# Patient Record
Sex: FEMALE | Race: WHITE
[De-identification: names, ages, dates, MRNs, and addresses within clinical notes are randomized per-mention and may not be internally consistent; named-entity substitution may affect disease eponyms.]

---

## 2017-06-23 ENCOUNTER — HOSPITAL ENCOUNTER (OUTPATIENT)
Dept: HOSPITAL 61 - SURG | Age: 82
Discharge: HOME | End: 2017-06-23
Attending: INTERNAL MEDICINE
Payer: MEDICARE

## 2017-06-23 VITALS
SYSTOLIC BLOOD PRESSURE: 114 MMHG | SYSTOLIC BLOOD PRESSURE: 114 MMHG | DIASTOLIC BLOOD PRESSURE: 47 MMHG | DIASTOLIC BLOOD PRESSURE: 47 MMHG

## 2017-06-23 DIAGNOSIS — E78.00: ICD-10-CM

## 2017-06-23 DIAGNOSIS — I10: ICD-10-CM

## 2017-06-23 DIAGNOSIS — E66.9: ICD-10-CM

## 2017-06-23 DIAGNOSIS — J44.9: ICD-10-CM

## 2017-06-23 DIAGNOSIS — Z98.41: ICD-10-CM

## 2017-06-23 DIAGNOSIS — M19.90: ICD-10-CM

## 2017-06-23 DIAGNOSIS — I48.91: Primary | ICD-10-CM

## 2017-06-23 DIAGNOSIS — Z87.39: ICD-10-CM

## 2017-06-23 DIAGNOSIS — I25.10: ICD-10-CM

## 2017-06-23 DIAGNOSIS — Z98.42: ICD-10-CM

## 2017-06-23 DIAGNOSIS — I73.9: ICD-10-CM

## 2017-06-23 DIAGNOSIS — Z88.2: ICD-10-CM

## 2017-06-23 DIAGNOSIS — Z90.710: ICD-10-CM

## 2017-06-23 LAB
ANION GAP SERPL CALC-SCNC: 11 MMOL/L (ref 6–14)
BUN SERPL-MCNC: 19 MG/DL (ref 7–20)
CALCIUM SERPL-MCNC: 8.7 MG/DL (ref 8.5–10.1)
CHLORIDE SERPL-SCNC: 104 MMOL/L (ref 98–107)
CO2 SERPL-SCNC: 27 MMOL/L (ref 21–32)
CREAT SERPL-MCNC: 1.1 MG/DL (ref 0.6–1)
GFR SERPLBLD BASED ON 1.73 SQ M-ARVRAT: 47.4 ML/MIN
GLUCOSE SERPL-MCNC: 97 MG/DL (ref 70–99)
MAGNESIUM SERPL-MCNC: 2 MG/DL (ref 1.8–2.4)
POTASSIUM SERPL-SCNC: 3.4 MMOL/L (ref 3.5–5.1)
SODIUM SERPL-SCNC: 142 MMOL/L (ref 136–145)

## 2017-06-23 PROCEDURE — 83735 ASSAY OF MAGNESIUM: CPT

## 2017-06-23 PROCEDURE — 36415 COLL VENOUS BLD VENIPUNCTURE: CPT

## 2017-06-23 PROCEDURE — 92960 CARDIOVERSION ELECTRIC EXT: CPT

## 2017-06-23 PROCEDURE — 80048 BASIC METABOLIC PNL TOTAL CA: CPT

## 2017-06-23 PROCEDURE — 93005 ELECTROCARDIOGRAM TRACING: CPT

## 2017-06-23 NOTE — EKG
[facility]

               [address] [place] [address]

Test Date:    2017               Test Time:    13:42:48

Pat Name:     BECKI ROJAS                Department:   

Patient ID:   [id number]           Room:          

Gender:       F                        Technician:   ALECIA

:          1934               Requested By: CURLY GAUTAM

Order Number: [id number]            Reading MD:     

                                 Measurements

Intervals                              Axis          

Rate:         92                       P:            

MN:                                    QRS:          51

QRSD:         106                      T:            112

QT:           410                                    

QTc:          513                                    

                           Interpretive Statements

IRREGULAR RHYTHM, NO P-WAVE FOUND

VENTRICULAR PREMATURE COMPLEX(ES)

LOW LIMB LEAD VOLTAGE

ST & T ABNORMALITY, CONSIDER

INFERIOR ISCHEMIA OR LEFT VENTRICULAR STRAIN

ABNORMAL ECG

RI6.01

Compared to ECG 2015 12:08:20

Atrial fibrillation no longer present

T-wave abnormality still present

Possible ischemia still present

## 2017-06-23 NOTE — CCR
DATE OF SERVICE:  06/23/2017



PROCEDURE DONE:  Cardioversion.



PERFORMING PHYSICIAN:  Curly Luna M.D.



INDICATIONS:  Atrial fibrillation.



COMPLICATIONS:  None.



DESCRIPTION OF PROCEDURE:  An informed consent was obtained from the patient. 

Anesthesiology team administered intravenous propofol for general anesthesia. 

The patient was then administered 200 joules of synchronized biphasic DC current

with successful conversion of the patient's rhythm to sinus rhythm.  The patient

was hemodynamically stable without any neurological deficits at the end of

procedure.  The patient tolerated the procedure well.



CONCLUSIONS:  Successful cardioversion of atrial fibrillation to sinus rhythm.

 



______________________________

CURLY LUNA MD



DR:  ANTWAN/selvin  JOB#:  [id number] / [id number]

DD:  06/23/2017 16:37  DT:  06/23/2017 21:12

## 2017-06-23 NOTE — EKG
[facility]

              [address] [place] [address]

Test Date:    2017               Test Time:    14:55:29

Pat Name:     BECKI ROJAS                Department:   

Patient ID:   [id number]           Room:          

Gender:       F                        Technician:   

:          1934               Requested By: CURLY GAUTAM

Order Number: [id number]            Reading MD:     

                                 Measurements

Intervals                              Axis          

Rate:         61                       P:            59

MD:           196                      QRS:          0

QRSD:         100                      T:            -29

QT:           546                                    

QTc:          552                                    

                           Interpretive Statements

SINUS RHYTHM

VENTRICULAR PREMATURE COMPLEX(ES)

ATRIAL PREMATURE COMPLEX(ES), BIGEMINY

LEFTWARD AXIS

LOW LIMB LEAD VOLTAGE

T ABNORMALITY IN INFERIOR LEADS

PROLONGED QT

ABNORMAL ECG

RI6.01

No previous ECG available for comparison

## 2017-07-31 ENCOUNTER — HOSPITAL ENCOUNTER (OUTPATIENT)
Dept: HOSPITAL 61 - US | Age: 82
Discharge: HOME | End: 2017-07-31
Attending: INTERNAL MEDICINE
Payer: COMMERCIAL

## 2017-07-31 DIAGNOSIS — R60.0: Primary | ICD-10-CM

## 2017-07-31 PROCEDURE — 93970 EXTREMITY STUDY: CPT

## 2017-07-31 NOTE — RAD
--------------- APPROVED REPORT --------------





Patient Location : OUT-PATIENT



Indications

Lower Extremity Edema :     Bilateral



Findings

The right great saphenous vein measures 7.6 mm. The right lesser saphenous vein measures 3.8 mm. Ther
e is no evidence of thrombus on limited grayscale images with normal color Doppler and spectral wavef
orms demonstrating no evidence of reflux.



The left great saphenous vein measures 7.1 mm.  The left lesser saphenous vein was not well visualize
d. There is no evidence of thrombus on limited grayscale images with normal color Doppler and spectra
l waveforms demonstrating no evidence of reflux.



Critical Notification

Critical Value: No



<Conclusion>

1. Negative for reflux in the bilateral greater and right lesser saphenous vein.

## 2017-08-25 ENCOUNTER — HOSPITAL ENCOUNTER (INPATIENT)
Dept: HOSPITAL 63 - ER | Age: 82
LOS: 4 days | Discharge: HOME | DRG: 314 | End: 2017-08-29
Attending: FAMILY MEDICINE | Admitting: FAMILY MEDICINE
Payer: MEDICARE

## 2017-08-25 VITALS — SYSTOLIC BLOOD PRESSURE: 116 MMHG | DIASTOLIC BLOOD PRESSURE: 78 MMHG

## 2017-08-25 VITALS — DIASTOLIC BLOOD PRESSURE: 88 MMHG | SYSTOLIC BLOOD PRESSURE: 125 MMHG

## 2017-08-25 VITALS — DIASTOLIC BLOOD PRESSURE: 77 MMHG | SYSTOLIC BLOOD PRESSURE: 118 MMHG

## 2017-08-25 VITALS — HEIGHT: 68 IN | WEIGHT: 193.31 LBS | BODY MASS INDEX: 29.3 KG/M2

## 2017-08-25 VITALS — DIASTOLIC BLOOD PRESSURE: 77 MMHG | SYSTOLIC BLOOD PRESSURE: 119 MMHG

## 2017-08-25 VITALS — SYSTOLIC BLOOD PRESSURE: 111 MMHG | DIASTOLIC BLOOD PRESSURE: 71 MMHG

## 2017-08-25 VITALS — DIASTOLIC BLOOD PRESSURE: 69 MMHG | SYSTOLIC BLOOD PRESSURE: 110 MMHG

## 2017-08-25 VITALS — DIASTOLIC BLOOD PRESSURE: 73 MMHG | SYSTOLIC BLOOD PRESSURE: 122 MMHG

## 2017-08-25 VITALS — DIASTOLIC BLOOD PRESSURE: 79 MMHG | SYSTOLIC BLOOD PRESSURE: 124 MMHG

## 2017-08-25 VITALS — DIASTOLIC BLOOD PRESSURE: 72 MMHG | SYSTOLIC BLOOD PRESSURE: 120 MMHG

## 2017-08-25 DIAGNOSIS — N17.0: ICD-10-CM

## 2017-08-25 DIAGNOSIS — K21.9: ICD-10-CM

## 2017-08-25 DIAGNOSIS — I95.9: Primary | ICD-10-CM

## 2017-08-25 DIAGNOSIS — I11.0: ICD-10-CM

## 2017-08-25 DIAGNOSIS — Z95.820: ICD-10-CM

## 2017-08-25 DIAGNOSIS — N28.9: ICD-10-CM

## 2017-08-25 DIAGNOSIS — E78.5: ICD-10-CM

## 2017-08-25 DIAGNOSIS — I27.2: ICD-10-CM

## 2017-08-25 DIAGNOSIS — R06.89: ICD-10-CM

## 2017-08-25 DIAGNOSIS — I50.43: ICD-10-CM

## 2017-08-25 DIAGNOSIS — Z87.891: ICD-10-CM

## 2017-08-25 DIAGNOSIS — I07.1: ICD-10-CM

## 2017-08-25 DIAGNOSIS — Z90.710: ICD-10-CM

## 2017-08-25 DIAGNOSIS — I89.0: ICD-10-CM

## 2017-08-25 DIAGNOSIS — I48.91: ICD-10-CM

## 2017-08-25 DIAGNOSIS — I73.9: ICD-10-CM

## 2017-08-25 DIAGNOSIS — Z88.2: ICD-10-CM

## 2017-08-25 DIAGNOSIS — J44.1: ICD-10-CM

## 2017-08-25 DIAGNOSIS — I34.0: ICD-10-CM

## 2017-08-25 LAB
ALBUMIN SERPL-MCNC: 3.4 G/DL (ref 3.4–5)
ALBUMIN/GLOB SERPL: 1 {RATIO} (ref 1–1.7)
ALP SERPL-CCNC: 121 U/L (ref 46–116)
ALT SERPL-CCNC: 17 U/L (ref 14–59)
ANION GAP SERPL CALC-SCNC: 8 MMOL/L (ref 6–14)
AST SERPL-CCNC: 22 U/L (ref 15–37)
BASOPHILS # BLD AUTO: 0.1 X10^3/UL (ref 0–0.2)
BASOPHILS NFR BLD: 1 % (ref 0–3)
BILIRUB SERPL-MCNC: 1.2 MG/DL (ref 0.2–1)
BUN/CREAT SERPL: 18 (ref 6–20)
CA-I SERPL ISE-MCNC: 22 MG/DL (ref 7–20)
CALCIUM SERPL-MCNC: 9 MG/DL (ref 8.5–10.1)
CHLORIDE SERPL-SCNC: 105 MMOL/L (ref 98–107)
CO2 SERPL-SCNC: 25 MMOL/L (ref 21–32)
CREAT SERPL-MCNC: 1.2 MG/DL (ref 0.6–1)
EOSINOPHIL NFR BLD: 0 % (ref 0–3)
EOSINOPHIL NFR BLD: 0 X10^3/UL (ref 0–0.7)
ERYTHROCYTE [DISTWIDTH] IN BLOOD BY AUTOMATED COUNT: 14.3 % (ref 11.5–14.5)
GFR SERPLBLD BASED ON 1.73 SQ M-ARVRAT: 42.9 ML/MIN
GLOBULIN SER-MCNC: 3.3 G/DL (ref 2.2–3.8)
GLUCOSE SERPL-MCNC: 114 MG/DL (ref 70–99)
HCT VFR BLD CALC: 34.1 % (ref 36–47)
HGB BLD-MCNC: 11 G/DL (ref 12–15.5)
LYMPHOCYTES # BLD: 1.1 X10^3/UL (ref 1–4.8)
LYMPHOCYTES NFR BLD AUTO: 17 % (ref 24–48)
MCH RBC QN AUTO: 30 PG (ref 25–35)
MCHC RBC AUTO-ENTMCNC: 32 G/DL (ref 31–37)
MCV RBC AUTO: 92 FL (ref 79–100)
MONO #: 0.9 X10^3/UL (ref 0–1.1)
MONOCYTES NFR BLD: 13 % (ref 0–9)
NEUT #: 4.6 X10^3UL (ref 1.8–7.7)
NEUTROPHILS NFR BLD AUTO: 69 % (ref 31–73)
PLATELET # BLD AUTO: 223 X10^3/UL (ref 140–400)
POTASSIUM SERPL-SCNC: 4.1 MMOL/L (ref 3.5–5.1)
PROT SERPL-MCNC: 6.7 G/DL (ref 6.4–8.2)
RBC # BLD AUTO: 3.69 X10^6/UL (ref 3.5–5.4)
SODIUM SERPL-SCNC: 138 MMOL/L (ref 136–145)
WBC # BLD AUTO: 6.6 X10^3/UL (ref 4–11)

## 2017-08-25 PROCEDURE — 83735 ASSAY OF MAGNESIUM: CPT

## 2017-08-25 PROCEDURE — 85007 BL SMEAR W/DIFF WBC COUNT: CPT

## 2017-08-25 PROCEDURE — 96374 THER/PROPH/DIAG INJ IV PUSH: CPT

## 2017-08-25 PROCEDURE — 83880 ASSAY OF NATRIURETIC PEPTIDE: CPT

## 2017-08-25 PROCEDURE — 93005 ELECTROCARDIOGRAM TRACING: CPT

## 2017-08-25 PROCEDURE — 87641 MR-STAPH DNA AMP PROBE: CPT

## 2017-08-25 PROCEDURE — 71010: CPT

## 2017-08-25 PROCEDURE — 36415 COLL VENOUS BLD VENIPUNCTURE: CPT

## 2017-08-25 PROCEDURE — 85027 COMPLETE CBC AUTOMATED: CPT

## 2017-08-25 PROCEDURE — 85025 COMPLETE CBC W/AUTO DIFF WBC: CPT

## 2017-08-25 PROCEDURE — 84484 ASSAY OF TROPONIN QUANT: CPT

## 2017-08-25 PROCEDURE — 94640 AIRWAY INHALATION TREATMENT: CPT

## 2017-08-25 PROCEDURE — 80053 COMPREHEN METABOLIC PANEL: CPT

## 2017-08-25 RX ADMIN — METHYLPREDNISOLONE SODIUM SUCCINATE SCH MG: 40 INJECTION, POWDER, FOR SOLUTION INTRAMUSCULAR; INTRAVENOUS at 16:54

## 2017-08-25 RX ADMIN — IPRATROPIUM BROMIDE AND ALBUTEROL SULFATE SCH ML: .5; 3 SOLUTION RESPIRATORY (INHALATION) at 16:05

## 2017-08-25 RX ADMIN — METHYLPREDNISOLONE SODIUM SUCCINATE SCH MG: 40 INJECTION, POWDER, FOR SOLUTION INTRAMUSCULAR; INTRAVENOUS at 20:59

## 2017-08-25 RX ADMIN — GUAIFENESIN AND DEXTROMETHORPHAN HYDROBROMIDE SCH TAB: 600; 30 TABLET, EXTENDED RELEASE ORAL at 16:54

## 2017-08-25 RX ADMIN — POTASSIUM CHLORIDE SCH MEQ: 1500 TABLET, EXTENDED RELEASE ORAL at 20:59

## 2017-08-25 RX ADMIN — SIMVASTATIN SCH MG: 5 TABLET, FILM COATED ORAL at 20:59

## 2017-08-25 RX ADMIN — GUAIFENESIN AND DEXTROMETHORPHAN HYDROBROMIDE SCH TAB: 600; 30 TABLET, EXTENDED RELEASE ORAL at 20:59

## 2017-08-25 RX ADMIN — IPRATROPIUM BROMIDE AND ALBUTEROL SULFATE SCH ML: .5; 3 SOLUTION RESPIRATORY (INHALATION) at 21:17

## 2017-08-25 NOTE — PHYS DOC
Past History


Past Medical History:  A-Fib, COPD, GERD, Other


Past Surgical History:  Hysterectomy, Other


Alcohol Use:  None


Drug Use:  None





Adult General


Chief Complaint


Chief Complaint:  CHEST PAIN





HPI


HPI





83-year-old female with a history of atrial fibrillation, COPD, suspected 

evolving congestive heart failure, [facility], now presents the emergency 

department complaining of palpitations and mild shortness of breath today. She'

s had these symptoms over the last several days. She has shortness of breath it'

s worse when lying down as well as with exertion. Denies chest pain at any 

time. No productive cough or fever. Patient went to her primary care doctor 

this morning and was evaluated and referred to the emergency department for 

admission given the finding of atrial fibrillation with rapid ventricular 

response. She states she is comfortable at rest.





Review of Systems


Review of Systems





Constitutional: Denies fever or chills []


Eyes: Denies change in visual acuity, redness, or eye pain []


HENT: Denies nasal congestion or sore throat []


Respiratory: Denies cough or shortness of breath []


Cardiovascular: No additional information not addressed in HPI []


GI: Denies abdominal pain, nausea, vomiting, bloody stools or diarrhea []


: Denies dysuria or hematuria []


Musculoskeletal: Denies back pain or joint pain []


Integument: Denies rash or skin lesions []


Neurologic: Denies headache, focal weakness or sensory changes []


Endocrine: Denies polyuria or polydipsia []





Current Medications


Current Medications





Current Medications








 Medications


  (Trade)  Dose


 Ordered  Sig/Dariel  Start Time


 Stop Time Status Last Admin


Dose Admin


 


 Amiodarone HCl


 900 mg/Dextrose  518 ml @ 0


 mls/hr  1X  ONCE  8/25/17 11:45


 8/25/17 11:45 DC  


 


 


 Dextrose  100 ml @ 


 As Directed  STK-MED ONCE  8/25/17 10:53


 8/25/17 10:54 DC  


 


 


 Diltiazem HCl


  (Cardizem)  125 mg  STK-MED ONCE  8/25/17 10:54


 8/25/17 10:55 DC  


 


 


 Diltiazem HCl 125


 mg/Dextrose  125 ml @ 0


 mls/hr  1X  ONCE  8/25/17 10:45


 8/25/17 10:46 DC 8/25/17 11:03


5 MLS/HR


 


 Sodium Chloride  1,000 ml @ 


 500 mls/hr  1X  ONCE  8/25/17 10:45


 8/25/17 12:44   


 











Allergies


Allergies





Allergies








Coded Allergies Type Severity Reaction Last Updated Verified


 


  Sulfa (Sulfonamide Antibiotics) Allergy Unknown  8/25/17 Yes











Physical Exam


Physical Exam





Constitutional: Well developed, well nourished, no acute distress, non-toxic 

appearance. []


HENT: Normocephalic, atraumatic, bilateral external ears normal, oropharynx 

moist, no oral exudates, nose normal. []


Eyes: PERRLA, EOMI, conjunctiva normal, no discharge. [] 


Neck: Normal range of motion, no tenderness, supple, no stridor. [] 


Cardiovascular: Irregularly irregular rhythm with tachycardia 130s. No murmur []


Lungs & Thorax:  Bilateral breath sounds clear to auscultation [] no wheezes 

rails rubs or rhonchi


Abdomen: Bowel sounds normal, soft, no tenderness, no masses, no pulsatile 

masses. [] 


Skin: Warm, dry, no erythema, no rash. [] 


Back: No tenderness, no CVA tenderness. [] 


Extremities: No tenderness, no cyanosis, no clubbing, ROM intact, 1+ pitting 

edema bilateral lower extremities with mild bilateral induration and no 

asymmetry of findings.


Neurologic: Alert and oriented X 3, normal motor function, normal sensory 

function, no focal deficits noted. []


Psychologic: Affect normal, judgement normal, mood normal. []





Current Patient Data


Vital Signs





 Vital Signs








  Date Time  Temp Pulse Resp B/P (MAP) Pulse Ox O2 Delivery O2 Flow Rate FiO2


 


8/25/17 11:13  102 16 98/53 (68) 94   


 


8/25/17 10:16 97.9     Room Air  








Lab Results





 Laboratory Tests








Test


  8/25/17


10:43 8/25/17


10:53


 


White Blood Count


  6.6 x10^3/uL


(4.0-11.0) 


 


 


Red Blood Count


  3.69 x10^6/uL


(3.50-5.40) 


 


 


Hemoglobin


  11.0 g/dL


(12.0-15.5)  L 


 


 


Hematocrit


  34.1 %


(36.0-47.0)  L 


 


 


Mean Corpuscular Volume


  92 fL ()


  


 


 


Mean Corpuscular Hemoglobin 30 pg (25-35)   


 


Mean Corpuscular Hemoglobin


Concent 32 g/dL


(31-37) 


 


 


Red Cell Distribution Width


  14.3 %


(11.5-14.5) 


 


 


Platelet Count


  223 x10^3/uL


(140-400) 


 


 


Neutrophils (%) (Auto) 69 % (31-73)   


 


Lymphocytes (%) (Auto) 17 % (24-48)  L 


 


Monocytes (%) (Auto) 13 % (0-9)  H 


 


Eosinophils (%) (Auto) 0 % (0-3)   


 


Basophils (%) (Auto) 1 % (0-3)   


 


Neutrophils # (Auto)


  4.6 x10^3uL


(1.8-7.7) 


 


 


Lymphocytes # (Auto)


  1.1 x10^3/uL


(1.0-4.8) 


 


 


Monocytes # (Auto)


  0.9 x10^3/uL


(0.0-1.1) 


 


 


Eosinophils # (Auto)


  0.0 x10^3/uL


(0.0-0.7) 


 


 


Basophils # (Auto)


  0.1 x10^3/uL


(0.0-0.2) 


 


 


Sodium Level


  138 mmol/L


(136-145) 


 


 


Potassium Level


  4.1 mmol/L


(3.5-5.1) 


 


 


Chloride Level


  105 mmol/L


() 


 


 


Carbon Dioxide Level


  25 mmol/L


(21-32) 


 


 


Anion Gap 8 (6-14)   


 


Blood Urea Nitrogen


  22 mg/dL


(7-20)  H 


 


 


Creatinine


  1.2 mg/dL


(0.6-1.0)  H 


 


 


Estimated GFR


(Cockcroft-Gault) 42.9  


  


 


 


BUN/Creatinine Ratio 18 (6-20)   


 


Glucose Level


  114 mg/dL


(70-99)  H 


 


 


Calcium Level


  9.0 mg/dL


(8.5-10.1) 


 


 


Total Bilirubin


  1.2 mg/dL


(0.2-1.0)  H 


 


 


Aspartate Amino Transferase


(AST) 22 U/L (15-37)


  


 


 


Alanine Aminotransferase (ALT)


  17 U/L (14-59)


  


 


 


Alkaline Phosphatase


  121 U/L


()  H 


 


 


Total Protein


  6.7 g/dL


(6.4-8.2) 


 


 


Albumin


  3.4 g/dL


(3.4-5.0) 


 


 


Albumin/Globulin Ratio 1.0 (1.0-1.7)   


 


POC Troponin I


  


  0.01 ng/ml


(<0.08)











EKG


EKG


[]





Radiology/Procedures


Radiology/Procedures


[]





Course & Med Decision Making


Course & Med Decision Making


Pertinent Labs and Imaging studies reviewed. (See chart for details)





[]





Dragon Disclaimer


Dragon Disclaimer


This chart was dictated in whole or in part using Voice Recognition software in 

a busy, high-work load, and often noisy Emergency Department environment.  It 

may contain unintended and wholly unrecognized errors or omissions.





Departure


Departure:


Referrals:  


LUCI PANDYA MD (PCP)











BIN COWAN MD Aug 25, 2017 11:52

## 2017-08-25 NOTE — HP
ADMIT DATE:  08/25/2017



REASON FOR ADMISSION:  Atrial fibrillation with RVR.



HISTORY OF PRESENT ILLNESS:  This is an 83-year-old female who has a history of

recurrent atrial fibrillation status post ECV in 01/2016 and 06/2017 with

successful conversion to sinus rhythm per Cardiology.  She states she has become

progressively short of breath, could not sleep and was up all night last night. 

She has been coughing up sputum and noticed a 14-pound weight gain.



PAST MEDICAL HISTORY:  Chronic diastolic heart failure, recurrent atrial

fibrillation, COPD, hyperlipidemia, chronic lymphedema, peripheral arterial

disease with left subclavian stenosis status post PTA, moderate mitral regurg,

moderate tricuspid regurg and pulmonary hypertension.



PAST SURGICAL HISTORY:  Left subclavian stent, hysterectomy, tonsillectomy.



FAMILY HISTORY:  Noncontributory.



SOCIAL HISTORY:  Smoked years ago, but quit.  No alcohol or drugs.



ALLERGIES:  SULFA.



MEDICATIONS:  Reviewed and the list is accurate on the MAR.



REVIEW OF SYSTEMS:  Positive for weight gain, shortness of breath and wheezing,

and reports ankle swelling.



OBJECTIVE:

VITAL SIGNS:  Blood pressure 122/73, pulse 102, temperature 98.1, respiratory

rate 23, O2 sat is 94-96% on room air.  Height 68 inches, weight 197.6 pounds.

GENERAL:  The patient's color is greyish.

HEENT:  Hearing is normal.  Eyes are clear.  Nose is patent.  Throat was clear. 

Tongue was moist.

NECK:  Supple.

LUNGS:  With diffuse inspiratory and expiratory wheezes.

CARDIOVASCULAR:  Irregular rhythm and rate consistent with AFib, difficult to

hear heart sounds due to wheezing.

ABDOMEN:  Soft, nontender.

EXTREMITIES:  With 1-2+ edema.  Pulses are weak in her feet.



LABORATORY DATA:  Hemoglobin 11.0, hematocrit 34.1.  Chemistry:  BUN 22,

creatinine 1.2.  BNP 2569, troponin 0.01.  EKG is not available to review on the

computer.



ASSESSMENT:

1.  Atrial fibrillation with rapid ventricular response.

2.  Acute exacerbation of chronic obstructive pulmonary disease.

3.  Acute on chronic diastolic heart failure.

4.  Chronic lymphedema.

5.  Peripheral arterial disease.

6.  Moderate mitral regurge.

7.  Moderate tricuspid regurge.

8.  Moderate pulmonary hypertension.

9.  Hypertension.



PLAN:  She is on amiodarone drip, being monitored by Cardiology, will receive

some IV Lasix.  I have added in small dose of Solu-Medrol with breathing

treatments and Mucinex and will continue to monitor.





______________________________

ELIANE WOLF DO



DR:  NONI/selvin  JOB#:  [id number] / [id number]

DD:  08/25/2017 16:48  DT:  08/25/2017 18:19

## 2017-08-25 NOTE — RAD
Portable chest, 8/25/2017:



History: Chest pain



Comparison is made to a study from 10/30/2010. The heart is at the upper

limits of normal in size. There is tortuosity of the thoracic aorta. The

pulmonary vascularity is normal. There is minimal atelectasis or scarring

partially obscuring the left hemidiaphragm. The lungs are otherwise clear. No

definite pleural fluid is appreciated.



IMPRESSION:

1. Borderline cardiomegaly.

2. Minimal left basilar atelectasis or scarring..

## 2017-08-25 NOTE — PDOC2
ODETTE HANKS 8/25/17 1111:


CONSULT


Date of Admission


DATE: 8/25/17 


TIME: 11:10


Reason for Consult:


atrial fibrillation with RVR


History of Present Illness


Ms Johnson is an 83 year old female with history of recurrent atrial fibrillation s/

p ECV in Jan 2015 and June 2017 with successful conversion to sinus rhythm,  

chronic diastolic heart failure, hypertension, PAD and hyperlipidemia.  She 

presents today after being seen in her PCP office with complaints of dyspnea 

and weight gain.  She was found to be back in atrial fibrillation with RVR.  

She reports increased use of her inhalers and wheezing lately.  Her daughter 

apparently felt she was more short of breath is not bad.  She is visibly short 

of breath and reports a gain of 10 lbs recently.  She denies chest discomfort, 

orthopnea or PND.


Past Medical History


diastolic heart failure


recurrent atrial fibrillation


COPD


Hyperlipidemia


chronic lymphedema, 


PAD - left subclavian stenosis s/p PTA





Echo 11/2016


EF 45-50%


diastolic dysfunction


moderate MR


Moderate TR


PAS 40-50 mmHg





MPI 11/16


Normal Perfusion


Past Surgical History


left subclavian stent


hysterectomy


Family History


non contributory due to age


Social History


prior smoker, no significant ETOH, no illicit drugs


Current Medications





Current Medications


Sodium Chloride 1,000 ml @  500 mls/hr 1X  ONCE IV ;  Start 8/25/17 at 10:45;  

Stop 8/25/17 at 12:44


Diltiazem HCl (Cardizem) 10 mg 1X  ONCE IVP  Last administered on 8/25/17at 11:

01;  Start 8/25/17 at 10:40;  Stop 8/25/17 at 10:41;  Status DC


Diltiazem HCl 125 mg/Dextrose 125 ml @ 0 mls/hr 1X  ONCE IV  Last administered 

on 8/25/17at 11:03;  Start 8/25/17 at 10:45;  Stop 8/25/17 at 10:46;  Status DC


Dextrose 100 ml @  As Directed STK-MED ONCE IV ;  Start 8/25/17 at 10:53;  Stop 

8/25/17 at 10:54;  Status DC


Diltiazem HCl (Cardizem) 125 mg STK-MED ONCE IV ;  Start 8/25/17 at 10:54;  

Stop 8/25/17 at 10:55;  Status DC





Active Scripts


Active


Reported


Simvastatin 5 Mg Tablet 5 Mg PO  


Multi-Vitamin Daily (Multivitamin) 1 Each Tablet 1 Each PO


Allergies:  


Coded Allergies:  


     Sulfa (Sulfonamide Antibiotics) (Verified  Allergy, Unknown, 8/25/17)


Review of System


as per HPI


General:  Alert, Oriented X3, Cooperative, mild distress


HEENT:  Atraumatic, EOMI, Mucous membr. moist/pink


Lungs:  Other (decreased with scattered expiratory wheezing)


Heart:  Other


Abdomen:  Normal bowel sounds, Soft, No tenderness


Extremities:  No cyanosis, Normal pulses, Other (+chronic edema)


Neuro:  Normal speech


Psych/Mental Status:  Mental status NL, Mood NL


VITALS





Vital Signs








  Date Time  Temp Pulse Resp B/P (MAP) Pulse Ox O2 Delivery O2 Flow Rate FiO2


 


8/25/17 11:01  133  99/62    








Labs





Laboratory Tests








Test


  8/25/17


10:43


 


White Blood Count


  6.6 x10^3/uL


(4.0-11.0)


 


Red Blood Count


  3.69 x10^6/uL


(3.50-5.40)


 


Hemoglobin


  11.0 g/dL


(12.0-15.5)


 


Hematocrit


  34.1 %


(36.0-47.0)


 


Mean Corpuscular Volume 92 fL () 


 


Mean Corpuscular Hemoglobin 30 pg (25-35) 


 


Mean Corpuscular Hemoglobin


Concent 32 g/dL


(31-37)


 


Red Cell Distribution Width


  14.3 %


(11.5-14.5)


 


Platelet Count


  223 x10^3/uL


(140-400)


 


Neutrophils (%) (Auto) 69 % (31-73) 


 


Lymphocytes (%) (Auto) 17 % (24-48) 


 


Monocytes (%) (Auto) 13 % (0-9) 


 


Eosinophils (%) (Auto) 0 % (0-3) 


 


Basophils (%) (Auto) 1 % (0-3) 


 


Neutrophils # (Auto)


  4.6 x10^3uL


(1.8-7.7)


 


Lymphocytes # (Auto)


  1.1 x10^3/uL


(1.0-4.8)


 


Monocytes # (Auto)


  0.9 x10^3/uL


(0.0-1.1)


 


Eosinophils # (Auto)


  0.0 x10^3/uL


(0.0-0.7)


 


Basophils # (Auto)


  0.1 x10^3/uL


(0.0-0.2)








Images


EKG - atrial fibrillation with RVR, LBBB pattern


Assessment/Plan


1.  recurrent atrial fibrillation with RVR - amiodarone drip and discontinue 

fleccanide.  Continue Xarelto for stroke prophylaxis.  


2.  respiratory insufficiency acute on chronic - likely multifactorial due to 

COPD and acute on chronic combined systolic and diastolic heart failure - 

gentle diuresis for chf.  COPD per PCP.


3.  hypotension - mild - change Cardizem to amiodarone


4.  hyperlipidemia - continue statin.


Problems:  





CURLY GAUTAM MD 8/25/17 1639:


CONSULT


Allergies:  


Coded Allergies:  


     Sulfa (Sulfonamide Antibiotics) (Verified  Allergy, Unknown, 8/25/17)


Assessment/Plan


Patient seen and examined. Agree with NP's assessment and plan


Recurrent atrial fibrillation rate better controlled since admission.


Continue Cardizem and start amiodarone infusion per protocol since she failed 

flecainide for antiarrhythmic therapy.


Continue gentle diuresis for mild acute on chronic diastolic heart failure. 

Recent 2-D echo showed LVEF 45-50%.


Continue xarelto for stroke prophylaxis.


Thank you for your consultation.


Problems:  











ODETTE HANKS Aug 25, 2017 11:11


CURLY GAUTAM MD Aug 25, 2017 16:39

## 2017-08-25 NOTE — EKG
[facility] [address], [place] [address]

Test Date:    2017               Test Time:    10:40:56

Pat Name:     BECKI ROJAS                Department:   

Patient ID:   [id number]           Room:         [place] [address]

Gender:       F                        Technician:   

:          1934               Requested By: BIN COWAN

Order Number: [id number]            Reading MD:   Catrachito Omalley

                                 Measurements

Intervals                              Axis          

Rate:         130                      P:            

FL:                                    QRS:          -36

QRSD:         128                      T:            99

QT:           342                                    

QTc:          503                                    

                           Interpretive Statements

ATRIAL FIB./FLUTTER WITH RAPID VENTRICULAR RESPONSE AND ABERRANT CONDUCTION

Electronically Signed On 2017 7:21:22 CDT by Catrachito Omalley

## 2017-08-26 VITALS — DIASTOLIC BLOOD PRESSURE: 62 MMHG | SYSTOLIC BLOOD PRESSURE: 108 MMHG

## 2017-08-26 VITALS — SYSTOLIC BLOOD PRESSURE: 125 MMHG | DIASTOLIC BLOOD PRESSURE: 68 MMHG

## 2017-08-26 VITALS — SYSTOLIC BLOOD PRESSURE: 109 MMHG | DIASTOLIC BLOOD PRESSURE: 79 MMHG

## 2017-08-26 VITALS — SYSTOLIC BLOOD PRESSURE: 146 MMHG | DIASTOLIC BLOOD PRESSURE: 66 MMHG

## 2017-08-26 VITALS — DIASTOLIC BLOOD PRESSURE: 60 MMHG | SYSTOLIC BLOOD PRESSURE: 101 MMHG

## 2017-08-26 VITALS — SYSTOLIC BLOOD PRESSURE: 119 MMHG | DIASTOLIC BLOOD PRESSURE: 75 MMHG

## 2017-08-26 VITALS — SYSTOLIC BLOOD PRESSURE: 86 MMHG | DIASTOLIC BLOOD PRESSURE: 55 MMHG

## 2017-08-26 VITALS — DIASTOLIC BLOOD PRESSURE: 60 MMHG | SYSTOLIC BLOOD PRESSURE: 126 MMHG

## 2017-08-26 VITALS — DIASTOLIC BLOOD PRESSURE: 62 MMHG | SYSTOLIC BLOOD PRESSURE: 112 MMHG

## 2017-08-26 VITALS — DIASTOLIC BLOOD PRESSURE: 64 MMHG | SYSTOLIC BLOOD PRESSURE: 97 MMHG

## 2017-08-26 VITALS — SYSTOLIC BLOOD PRESSURE: 98 MMHG | DIASTOLIC BLOOD PRESSURE: 62 MMHG

## 2017-08-26 VITALS — SYSTOLIC BLOOD PRESSURE: 99 MMHG | DIASTOLIC BLOOD PRESSURE: 62 MMHG

## 2017-08-26 VITALS — DIASTOLIC BLOOD PRESSURE: 66 MMHG | SYSTOLIC BLOOD PRESSURE: 146 MMHG

## 2017-08-26 VITALS — DIASTOLIC BLOOD PRESSURE: 53 MMHG | SYSTOLIC BLOOD PRESSURE: 86 MMHG

## 2017-08-26 VITALS — DIASTOLIC BLOOD PRESSURE: 79 MMHG | SYSTOLIC BLOOD PRESSURE: 119 MMHG

## 2017-08-26 VITALS — SYSTOLIC BLOOD PRESSURE: 119 MMHG | DIASTOLIC BLOOD PRESSURE: 78 MMHG

## 2017-08-26 VITALS — DIASTOLIC BLOOD PRESSURE: 50 MMHG | SYSTOLIC BLOOD PRESSURE: 84 MMHG

## 2017-08-26 VITALS — SYSTOLIC BLOOD PRESSURE: 89 MMHG | DIASTOLIC BLOOD PRESSURE: 57 MMHG

## 2017-08-26 VITALS — SYSTOLIC BLOOD PRESSURE: 115 MMHG | DIASTOLIC BLOOD PRESSURE: 72 MMHG

## 2017-08-26 VITALS — DIASTOLIC BLOOD PRESSURE: 53 MMHG | SYSTOLIC BLOOD PRESSURE: 103 MMHG

## 2017-08-26 VITALS — DIASTOLIC BLOOD PRESSURE: 81 MMHG | SYSTOLIC BLOOD PRESSURE: 117 MMHG

## 2017-08-26 VITALS — SYSTOLIC BLOOD PRESSURE: 95 MMHG | DIASTOLIC BLOOD PRESSURE: 55 MMHG

## 2017-08-26 VITALS — DIASTOLIC BLOOD PRESSURE: 72 MMHG | SYSTOLIC BLOOD PRESSURE: 110 MMHG

## 2017-08-26 VITALS — SYSTOLIC BLOOD PRESSURE: 147 MMHG | DIASTOLIC BLOOD PRESSURE: 66 MMHG

## 2017-08-26 VITALS — SYSTOLIC BLOOD PRESSURE: 85 MMHG | DIASTOLIC BLOOD PRESSURE: 46 MMHG

## 2017-08-26 VITALS — DIASTOLIC BLOOD PRESSURE: 83 MMHG | SYSTOLIC BLOOD PRESSURE: 112 MMHG

## 2017-08-26 VITALS — DIASTOLIC BLOOD PRESSURE: 80 MMHG | SYSTOLIC BLOOD PRESSURE: 113 MMHG

## 2017-08-26 VITALS — SYSTOLIC BLOOD PRESSURE: 108 MMHG | DIASTOLIC BLOOD PRESSURE: 57 MMHG

## 2017-08-26 VITALS — SYSTOLIC BLOOD PRESSURE: 102 MMHG | DIASTOLIC BLOOD PRESSURE: 57 MMHG

## 2017-08-26 LAB
ALBUMIN SERPL-MCNC: 3.4 G/DL (ref 3.4–5)
ALBUMIN/GLOB SERPL: 0.9 {RATIO} (ref 1–1.7)
ALP SERPL-CCNC: 124 U/L (ref 46–116)
ALT SERPL-CCNC: 16 U/L (ref 14–59)
ANION GAP SERPL CALC-SCNC: 8 MMOL/L (ref 6–14)
AST SERPL-CCNC: 18 U/L (ref 15–37)
BASOPHILS # BLD AUTO: 0 X10^3/UL (ref 0–0.2)
BASOPHILS NFR BLD: 0 % (ref 0–3)
BILIRUB SERPL-MCNC: 1.1 MG/DL (ref 0.2–1)
BUN/CREAT SERPL: 16 (ref 6–20)
CA-I SERPL ISE-MCNC: 23 MG/DL (ref 7–20)
CALCIUM SERPL-MCNC: 9 MG/DL (ref 8.5–10.1)
CHLORIDE SERPL-SCNC: 104 MMOL/L (ref 98–107)
CO2 SERPL-SCNC: 26 MMOL/L (ref 21–32)
CREAT SERPL-MCNC: 1.4 MG/DL (ref 0.6–1)
EOSINOPHIL NFR BLD: 0 % (ref 0–3)
EOSINOPHIL NFR BLD: 0 X10^3/UL (ref 0–0.7)
ERYTHROCYTE [DISTWIDTH] IN BLOOD BY AUTOMATED COUNT: 15 % (ref 11.5–14.5)
GFR SERPLBLD BASED ON 1.73 SQ M-ARVRAT: 35.9 ML/MIN
GLOBULIN SER-MCNC: 3.6 G/DL (ref 2.2–3.8)
GLUCOSE SERPL-MCNC: 187 MG/DL (ref 70–99)
HCT VFR BLD CALC: 35.9 % (ref 36–47)
HGB BLD-MCNC: 11.6 G/DL (ref 12–15.5)
LYMPHOCYTES # BLD: 0.5 X10^3/UL (ref 1–4.8)
LYMPHOCYTES NFR BLD AUTO: 13 % (ref 24–48)
MAGNESIUM SERPL-MCNC: 1.9 MG/DL (ref 1.8–2.4)
MCH RBC QN AUTO: 30 PG (ref 25–35)
MCHC RBC AUTO-ENTMCNC: 32 G/DL (ref 31–37)
MCV RBC AUTO: 93 FL (ref 79–100)
MONO #: 0.1 X10^3/UL (ref 0–1.1)
MONOCYTES NFR BLD: 3 % (ref 0–9)
NEUT #: 3.4 X10^3UL (ref 1.8–7.7)
NEUTROPHILS NFR BLD AUTO: 84 % (ref 31–73)
PLATELET # BLD AUTO: 234 X10^3/UL (ref 140–400)
POTASSIUM SERPL-SCNC: 4 MMOL/L (ref 3.5–5.1)
PROT SERPL-MCNC: 7 G/DL (ref 6.4–8.2)
RBC # BLD AUTO: 3.88 X10^6/UL (ref 3.5–5.4)
SODIUM SERPL-SCNC: 138 MMOL/L (ref 136–145)
WBC # BLD AUTO: 4 X10^3/UL (ref 4–11)

## 2017-08-26 RX ADMIN — METHYLPREDNISOLONE SODIUM SUCCINATE SCH MG: 40 INJECTION, POWDER, FOR SOLUTION INTRAMUSCULAR; INTRAVENOUS at 09:14

## 2017-08-26 RX ADMIN — IPRATROPIUM BROMIDE AND ALBUTEROL SULFATE SCH ML: .5; 3 SOLUTION RESPIRATORY (INHALATION) at 15:47

## 2017-08-26 RX ADMIN — IPRATROPIUM BROMIDE AND ALBUTEROL SULFATE SCH ML: .5; 3 SOLUTION RESPIRATORY (INHALATION) at 10:10

## 2017-08-26 RX ADMIN — IPRATROPIUM BROMIDE AND ALBUTEROL SULFATE SCH ML: .5; 3 SOLUTION RESPIRATORY (INHALATION) at 05:19

## 2017-08-26 RX ADMIN — GUAIFENESIN AND DEXTROMETHORPHAN HYDROBROMIDE SCH TAB: 600; 30 TABLET, EXTENDED RELEASE ORAL at 20:45

## 2017-08-26 RX ADMIN — SIMVASTATIN SCH MG: 5 TABLET, FILM COATED ORAL at 20:45

## 2017-08-26 RX ADMIN — GUAIFENESIN AND DEXTROMETHORPHAN HYDROBROMIDE SCH TAB: 600; 30 TABLET, EXTENDED RELEASE ORAL at 09:15

## 2017-08-26 RX ADMIN — RIVAROXABAN SCH MG: 10 TABLET, FILM COATED ORAL at 09:16

## 2017-08-26 RX ADMIN — IPRATROPIUM BROMIDE AND ALBUTEROL SULFATE SCH ML: .5; 3 SOLUTION RESPIRATORY (INHALATION) at 21:01

## 2017-08-26 RX ADMIN — ASPIRIN 81 MG SCH MG: 81 TABLET ORAL at 09:15

## 2017-08-26 RX ADMIN — POTASSIUM CHLORIDE SCH MEQ: 1500 TABLET, EXTENDED RELEASE ORAL at 20:45

## 2017-08-26 RX ADMIN — CLOPIDOGREL BISULFATE SCH MG: 75 TABLET ORAL at 09:16

## 2017-08-26 RX ADMIN — METHYLPREDNISOLONE SODIUM SUCCINATE SCH MG: 40 INJECTION, POWDER, FOR SOLUTION INTRAMUSCULAR; INTRAVENOUS at 20:44

## 2017-08-26 RX ADMIN — Medication SCH MG: at 09:15

## 2017-08-26 RX ADMIN — POTASSIUM CHLORIDE SCH MEQ: 1500 TABLET, EXTENDED RELEASE ORAL at 09:15

## 2017-08-26 NOTE — ACF
Admission Criteria Forms


                                         ATRIAL FIBRILLATION





Clinical Indications for Admission to Inpatient Care


                                                                ([place]/check 

or initial the applicable condition/criteria) 





Admission is indicated for ANY ONE of the following(1)(2)(3)(4)(5)(6)(7)(8):





[ ]I.     Myocardial ischemia that persists despite outpatient and observation 

care treatment (13)


[ ]II.    Myocardial infarction 


[ ]III.   Hemodynamic instability


[ ]IV.   Heart failure (e.g., pulmonary edema) (14)


[ ]V.    Altered mental status that is severe or persistent


          complications secondary to comorbidities (eg, symptomatic heart 

failure)


[ ]VI.   Syncope


[ ]VII.  Patient has implantable cardioverter defibrillator that has fired more 

than once within past 24hror needs immediate adjustment of settings that cannot 

be done other than in inpatient                      setting. (15)


[ ]VIII. Suspected accessory pathway (e.g., Matthew-Parkinson-White syndrome) on 

ECG


[ ]IX.   Medication toxicity (e.g., digitalis) causing arrhythmia(16)


[ ]X.    Underlying medical condition that necessitates inpatient care(e.g., 

thyrotoxicosis, pneumonia)(17)


[ ]XI.   Continuous ECG monitoring is required for condition causing arrhythmia 

(e.g., severe hyperkalemia, hypokalemia, acid-base disturbance)(18)(19)(20)


[ ]XII.  Initiation of antiarrhythmic drug therapy is needed in patient at high 

risk of adverse effects as indicated by ANY ONE of the following:


           [ ]a)    Significant structural heart disease (e.g., reduced 

ejection fraction, congenital heart disease, valvular heart disease)        


           [ ]b)    Prolonged QT interval


           [ ]c)    Underlying sinus node or atrioventricular conduction 

disturbances


           [ ]d)    Need for treatment with antiarrhythmic drugs that have 

significant proarrhythmic potential (e.g., dofetilide, sotalol, procainamide)


           [ ]e)    Patient whose sinus rhythm has never been observed on ECG


[X]XIII.   Persistent symptomatic tachycardia (rate > 100 bpm) despite 

outpatient and observation level of care (eg, rate cannot be sufficiently 

controlled


[ ]XIV.  Elective or urgent cardioversion that cannot be performed on 

outpatient basis or during observation care. [A] (Use also A Fib: Observation 

Care ) as appropriate.(21)(22)(23)











Extended stay beyond goal length of stay may be needed for (1)(43)(44):





[ ]a)   Unstable comorbidities (eg, heart failure, COPD, renal insuffciency)


[ ]b)   Persistently uncontrolled atrial fibrillation or other arrhythmias (45)


[ ]c)   Acute thromboembolic event (e.g., stroke, limb ischemia)


[ ]d)   Need for inpatient attainment of full anticoagulation(28)(46)(47)











The original [facility] content created by [facility] 

[employer] has been revised. 


The portions of the content which have been revised are identified through the 

use of italic text, and [facility] 


has neither reviewed nor approved the modified material. All other unmodified 

content is copyright  [facility].





Please see references footnoted in the original [facility] [employer] edition 

2015


Admission Criteria Met?:  Yes











NOEMI FAIR Aug 26, 2017 06:53

## 2017-08-26 NOTE — PDOC
PROGRESS NOTES


Assessment


1.  recurrent atrial fibrillation with RVR - on amiodarone drip.  rate remains 

uncontrolled.  blood pressure low, add digoxin.  Continue Xarelto for stroke 

prophylaxis.  


2.  respiratory insufficiency acute on chronic - likely multifactorial due to 

COPD and acute on chronic combined systolic and diastolic heart failure - good 

output with IV lasix.  change to PO.  COPD per PCP.


3.  renal insufficiency - change lasix to PO.  monitor labs.


4.  hypotension - mild.  monitor


4.  hyperlipidemia - continue statin.


Problems:  


Subjective


She reports improvement in breathing.  No chest pain, no lightheadedness, no 

palpitations.


Objective


tele -  atrial fibrillation with RVR, 110-120s. 





Vital Signs








  Date Time  Temp Pulse Resp B/P (MAP) Pulse Ox O2 Delivery O2 Flow Rate FiO2


 


8/26/17 08:00      Nasal Cannula 2.0 


 


8/26/17 07:11  115 17 99/62 (74) 99   


 


8/26/17 05:19 97.6       














Intake and Output 


 


 8/26/17





 07:00


 


Intake Total 840 ml


 


Output Total 1900 ml


 


Balance -1060 ml


 


 


 


Intake Oral 840 ml


 


Output Urine Total 1900 ml








Abdomen:  Normal bowel sounds, Soft, No tenderness


Heart:  Other (irregular rate and rhythm, no gallops)


Extremities:  No cyanosis, Normal pulses, Other (trace edema)


General:  Alert, Oriented X3, Cooperative, No acute distress


HEENT:  Atraumatic, EOMI, Mucous membr. moist/pink


Lungs:  Other (decreased with few expiratory wheezes)


Neuro:  Normal speech


Psych/Mental Status:  Mental status NL, Mood NL


Review of Relevant


I have reviewed the following items kandice (where applicable) has been applied.


Labs





Laboratory Tests








Test


  8/25/17


10:43 8/25/17


10:53 8/25/17


16:52 8/25/17


22:45


 


White Blood Count


  6.6 x10^3/uL


(4.0-11.0) 


  


  


 


 


Red Blood Count


  3.69 x10^6/uL


(3.50-5.40) 


  


  


 


 


Hemoglobin


  11.0 g/dL


(12.0-15.5) 


  


  


 


 


Hematocrit


  34.1 %


(36.0-47.0) 


  


  


 


 


Mean Corpuscular Volume 92 fL ()    


 


Mean Corpuscular Hemoglobin 30 pg (25-35)    


 


Mean Corpuscular Hemoglobin


Concent 32 g/dL


(31-37) 


  


  


 


 


Red Cell Distribution Width


  14.3 %


(11.5-14.5) 


  


  


 


 


Platelet Count


  223 x10^3/uL


(140-400) 


  


  


 


 


Neutrophils (%) (Auto) 69 % (31-73)    


 


Lymphocytes (%) (Auto) 17 % (24-48)    


 


Monocytes (%) (Auto) 13 % (0-9)    


 


Eosinophils (%) (Auto) 0 % (0-3)    


 


Basophils (%) (Auto) 1 % (0-3)    


 


Neutrophils # (Auto)


  4.6 x10^3uL


(1.8-7.7) 


  


  


 


 


Lymphocytes # (Auto)


  1.1 x10^3/uL


(1.0-4.8) 


  


  


 


 


Monocytes # (Auto)


  0.9 x10^3/uL


(0.0-1.1) 


  


  


 


 


Eosinophils # (Auto)


  0.0 x10^3/uL


(0.0-0.7) 


  


  


 


 


Basophils # (Auto)


  0.1 x10^3/uL


(0.0-0.2) 


  


  


 


 


Sodium Level


  138 mmol/L


(136-145) 


  


  


 


 


Potassium Level


  4.1 mmol/L


(3.5-5.1) 


  


  


 


 


Chloride Level


  105 mmol/L


() 


  


  


 


 


Carbon Dioxide Level


  25 mmol/L


(21-32) 


  


  


 


 


Anion Gap 8 (6-14)    


 


Blood Urea Nitrogen


  22 mg/dL


(7-20) 


  


  


 


 


Creatinine


  1.2 mg/dL


(0.6-1.0) 


  


  


 


 


Estimated GFR


(Cockcroft-Gault) 42.9 


  


  


  


 


 


BUN/Creatinine Ratio 18 (6-20)    


 


Glucose Level


  114 mg/dL


(70-99) 


  


  


 


 


Calcium Level


  9.0 mg/dL


(8.5-10.1) 


  


  


 


 


Total Bilirubin


  1.2 mg/dL


(0.2-1.0) 


  


  


 


 


Aspartate Amino Transf


(AST/SGOT) 22 U/L (15-37) 


  


  


  


 


 


Alanine Aminotransferase


(ALT/SGPT) 17 U/L (14-59) 


  


  


  


 


 


Alkaline Phosphatase


  121 U/L


() 


  


  


 


 


NT-Pro-B-Type Natriuretic


Peptide 2569 pg/mL


(0-449) 


  


  


 


 


Total Protein


  6.7 g/dL


(6.4-8.2) 


  


  


 


 


Albumin


  3.4 g/dL


(3.4-5.0) 


  


  


 


 


Albumin/Globulin Ratio 1.0 (1.0-1.7)    


 


Bedside Troponin I


  


  0.01 ng/ml


(<0.08) 


  


 


 


Troponin I Quantitative


  


  


  < 0.017 ng/mL


(0-0.055) 0.019 ng/mL


(0-0.055)


 


Test


  8/26/17


05:44 


  


  


 


 


White Blood Count


  4.0 x10^3/uL


(4.0-11.0) 


  


  


 


 


Red Blood Count


  3.88 x10^6/uL


(3.50-5.40) 


  


  


 


 


Hemoglobin


  11.6 g/dL


(12.0-15.5) 


  


  


 


 


Hematocrit


  35.9 %


(36.0-47.0) 


  


  


 


 


Mean Corpuscular Volume 93 fL ()    


 


Mean Corpuscular Hemoglobin 30 pg (25-35)    


 


Mean Corpuscular Hemoglobin


Concent 32 g/dL


(31-37) 


  


  


 


 


Red Cell Distribution Width


  15.0 %


(11.5-14.5) 


  


  


 


 


Platelet Count


  234 x10^3/uL


(140-400) 


  


  


 


 


Neutrophils (%) (Auto) 84 % (31-73)    


 


Lymphocytes (%) (Auto) 13 % (24-48)    


 


Monocytes (%) (Auto) 3 % (0-9)    


 


Eosinophils (%) (Auto) 0 % (0-3)    


 


Basophils (%) (Auto) 0 % (0-3)    


 


Neutrophils # (Auto)


  3.4 x10^3uL


(1.8-7.7) 


  


  


 


 


Lymphocytes # (Auto)


  0.5 x10^3/uL


(1.0-4.8) 


  


  


 


 


Monocytes # (Auto)


  0.1 x10^3/uL


(0.0-1.1) 


  


  


 


 


Eosinophils # (Auto)


  0.0 x10^3/uL


(0.0-0.7) 


  


  


 


 


Basophils # (Auto)


  0.0 x10^3/uL


(0.0-0.2) 


  


  


 


 


Sodium Level


  138 mmol/L


(136-145) 


  


  


 


 


Potassium Level


  4.0 mmol/L


(3.5-5.1) 


  


  


 


 


Chloride Level


  104 mmol/L


() 


  


  


 


 


Carbon Dioxide Level


  26 mmol/L


(21-32) 


  


  


 


 


Anion Gap 8 (6-14)    


 


Blood Urea Nitrogen


  23 mg/dL


(7-20) 


  


  


 


 


Creatinine


  1.4 mg/dL


(0.6-1.0) 


  


  


 


 


Estimated GFR


(Cockcroft-Gault) 35.9 


  


  


  


 


 


BUN/Creatinine Ratio 16 (6-20)    


 


Glucose Level


  187 mg/dL


(70-99) 


  


  


 


 


Calcium Level


  9.0 mg/dL


(8.5-10.1) 


  


  


 


 


Magnesium Level


  1.9 mg/dL


(1.8-2.4) 


  


  


 


 


Total Bilirubin


  1.1 mg/dL


(0.2-1.0) 


  


  


 


 


Aspartate Amino Transf


(AST/SGOT) 18 U/L (15-37) 


  


  


  


 


 


Alanine Aminotransferase


(ALT/SGPT) 16 U/L (14-59) 


  


  


  


 


 


Alkaline Phosphatase


  124 U/L


() 


  


  


 


 


Total Protein


  7.0 g/dL


(6.4-8.2) 


  


  


 


 


Albumin


  3.4 g/dL


(3.4-5.0) 


  


  


 


 


Albumin/Globulin Ratio 0.9 (1.0-1.7)    








Medications





Current Medications


Sodium Chloride 1,000 ml @  500 mls/hr 1X  ONCE IV ;  Start 8/25/17 at 10:45;  

Stop 8/25/17 at 12:44;  Status DC


Diltiazem HCl (Cardizem) 10 mg 1X  ONCE IVP  Last administered on 8/25/17at 11:

01;  Start 8/25/17 at 10:40;  Stop 8/25/17 at 10:41;  Status DC


Diltiazem HCl 125 mg/Dextrose 125 ml @ 0 mls/hr 1X  ONCE IV  Last administered 

on 8/25/17at 11:03;  Start 8/25/17 at 10:45;  Stop 8/25/17 at 10:46;  Status DC


Dextrose 100 ml @  As Directed STK-MED ONCE IV ;  Start 8/25/17 at 10:53;  Stop 

8/25/17 at 10:54;  Status DC


Diltiazem HCl (Cardizem) 125 mg STK-MED ONCE IV ;  Start 8/25/17 at 10:54;  

Stop 8/25/17 at 10:55;  Status DC


Amiodarone HCl 900 mg/Dextrose 518 ml @ 0 mls/hr 1X  ONCE IV  Last administered 

on 8/25/17at 12:05;  Start 8/25/17 at 11:45;  Stop 8/25/17 at 11:46;  Status DC


Amiodarone HCl 900 mg/Dextrose 518 ml @ 0 mls/hr 1X  ONCE IV ;  Start 8/25/17 

at 11:45;  Stop 8/25/17 at 11:45;  Status DC


Ondansetron HCl (Zofran) 4 mg PRN Q8HRS  PRN IV NAUSEA/VOMITING;  Start 8/25/17 

at 13:45


Furosemide (Lasix) 40 mg DAILY IVP  Last administered on 8/25/17at 15:56;  

Start 8/25/17 at 15:00;  Stop 8/26/17 at 08:45;  Status DC


Amiodarone HCl 900 mg/Dextrose 518 ml @ 0 mls/hr CONT  PRN IV SEE I/O RECORD 

Last administered on 8/25/17at 15:27;  Start 8/25/17 at 15:00;  Stop 8/25/17 at 

15:27;  Status DC


Albuterol/ Ipratropium (Duoneb) 3 ml RTQID NEB  Last administered on 8/26/17at 

05:19;  Start 8/25/17 at 16:00


Guaifenesin (MUCINEX ER with DM) 1 tab BID PO  Last administered on 8/25/17at 20

:59;  Start 8/25/17 at 16:20


Methylprednisolone Sodium Succinate (SOLU-Medrol 40MG VIAL) 40 mg Q12HR IV  

Last administered on 8/25/17at 20:59;  Start 8/25/17 at 16:20


Aspirin (Children'S Aspirin) 81 mg DAILY PO ;  Start 8/26/17 at 09:00


Clopidogrel Bisulfate (Plavix) 75 mg DAILY PO ;  Start 8/26/17 at 09:00


Simvastatin (Zocor) 5 mg HS PO  Last administered on 8/25/17at 20:59;  Start 8/ 25/17 at 21:00


Potassium Chloride (Klor-Con) 20 meq BID PO  Last administered on 8/25/17at 20:

59;  Start 8/25/17 at 21:00


Rivaroxaban (Xarelto) 20 mg DAILY PO ;  Start 8/26/17 at 09:00


Levothyroxine Sodium (Synthroid) 25 mcg DAILY07 PO ;  Start 8/26/17 at 08:45


Furosemide (Lasix) 40 mg DAILY PO ;  Start 8/26/17 at 09:00;  Status UNV


Digoxin (Lanoxin) 500 mcg 1X  ONCE IV ;  Start 8/26/17 at 08:45;  Stop 8/26/17 

at 08:46;  Status UNV





Active Scripts


Active


Reported


Plavix (Clopidogrel Bisulfate) 75 Mg Tablet 75 Mg PO DAILY


Xarelto (Rivaroxaban) 20 Mg Tablet 20 Mg PO DAILY


Aspirin 81 Mg Tab.chew 81 Mg PO DAILY


Lasix (Furosemide) 40 Mg Tablet 1 Tab PO DAILY


K-Tab ER (Potassium Chloride) 20 Meq Tablet.er 20 Meq PO BID


Simvastatin 5 Mg Tablet 5 Mg PO HS


Multi-Vitamin Daily (Multivitamin) 1 Each Tablet 1 Each PO


Vitals/I & O





Vital Sign - Last 24 Hours








 8/25/17 8/25/17 8/25/17 8/25/17





 10:16 11:01 11:13 11:19


 


Temp 97.9   


 


Pulse 136 133 102 96


 


Resp 16  16 16


 


B/P (MAP)  99/62 98/53 (68) 107/59 (75)


 


Pulse Ox 96  94 95


 


O2 Delivery Room Air   


 


    





 8/25/17 8/25/17 8/25/17 8/25/17





 11:49 12:19 12:49 14:37


 


Temp    98.1


 


Pulse 114 96 97 102


 


Resp 16 16 16 23


 


B/P (MAP) 96/73 (81) 105/62 (76) 112/74 (87) 122/73 (89)


 


Pulse Ox 94 96 97 96


 


O2 Delivery    Room Air


 


    





 8/25/17 8/25/17 8/25/17 8/25/17





 16:07 16:22 17:22 18:19


 


Temp    97.7


 


Pulse  110 99 


 


Resp  23 23 


 


B/P (MAP)  119/77 (91) 116/78 (91) 


 


Pulse Ox 95 96 96 


 


O2 Delivery Room Air Room Air Room Air 


 


    





 8/25/17 8/25/17 8/25/17 8/25/17





 18:22 19:22 20:22 21:20


 


Pulse 113 107 101 


 


Resp 18 22 22 


 


B/P (MAP) 111/71 (84) 124/79 (94) 125/88 (100) 


 


Pulse Ox 96 97 98 95


 


O2 Delivery Nasal Cannula Nasal Cannula Nasal Cannula Room Air


 


O2 Flow Rate 2.0 2.0 2.0 





 8/25/17 8/25/17 8/25/17 8/26/17





 21:22 22:22 23:22 00:22


 


Pulse 119 116 117 120


 


Resp 22 22 22 22


 


B/P (MAP) 118/77 (91) 120/72 (88) 110/69 (83) 119/75 (90)


 


Pulse Ox 97 98 95 95


 


O2 Delivery Nasal Cannula Nasal Cannula Nasal Cannula Nasal Cannula


 


O2 Flow Rate 2.0 2.0 2.0 2.0





 8/26/17 8/26/17 8/26/17 8/26/17





 01:22 02:22 03:22 04:22


 


Pulse 104 111 112 114


 


Resp 22 20 20 22


 


B/P (MAP) 112/83 (93) 117/81 (93) 109/79 (89) 113/80 (91)


 


Pulse Ox 95 96 95 97


 


O2 Delivery Nasal Cannula Nasal Cannula Nasal Cannula Nasal Cannula


 


O2 Flow Rate 2.0 2.0 2.0 2.0





 8/26/17 8/26/17 8/26/17 8/26/17





 05:19 05:20 05:22 06:22


 


Temp 97.6   


 


Pulse   113 107


 


Resp   20 16


 


B/P (MAP)   119/79 (92) 119/79 (92)


 


Pulse Ox  96 98 98


 


O2 Delivery  Room Air Nasal Cannula Nasal Cannula


 


O2 Flow Rate   2.0 2.0


 


    





 8/26/17 8/26/17  





 07:11 08:00  


 


Pulse 115   


 


Resp 17   


 


B/P (MAP) 99/62 (74)   


 


Pulse Ox 99   


 


O2 Delivery Nasal Cannula Nasal Cannula  


 


O2 Flow Rate 2.0 2.0  














Intake and Output   


 


 8/25/17 8/25/17 8/26/17





 15:00 23:00 07:00


 


Intake Total  440 ml 400 ml


 


Output Total  1550 ml 350 ml


 


Balance  -1110 ml 50 ml

















ODETTE HANKS APRN Aug 26, 2017 08:55

## 2017-08-26 NOTE — EKG
[facility] [address], [place] [address]

Test Date:    2017               Test Time:    10:39:28

Pat Name:     BECKI ROJAS                Department:   

Patient ID:   [id number]           Room:         [place] [address]

Gender:       F                        Technician:   

:          [id number]               Requested By: ELIANE WOLF

Order Number: [id number]            Reading MD:   Catrachito Omalley

                                 Measurements

Intervals                              Axis          

Rate:         149                      P:            

LA:                                    QRS:          -34

QRSD:         130                      T:            83

QT:           320                                    

QTc:          508                                    

                           Interpretive Statements

WIDE COMPLEX TACHYCARDIA 

-MOST LIKELY ATRIAL FLUTTER WITH ABERRANT CONDUCTION



Electronically Signed On 2017 7:21:04 CDT by Catrachito Omalley

## 2017-08-27 VITALS — DIASTOLIC BLOOD PRESSURE: 58 MMHG | SYSTOLIC BLOOD PRESSURE: 105 MMHG

## 2017-08-27 VITALS — DIASTOLIC BLOOD PRESSURE: 57 MMHG | SYSTOLIC BLOOD PRESSURE: 94 MMHG

## 2017-08-27 VITALS — SYSTOLIC BLOOD PRESSURE: 110 MMHG | DIASTOLIC BLOOD PRESSURE: 56 MMHG

## 2017-08-27 VITALS — DIASTOLIC BLOOD PRESSURE: 50 MMHG | SYSTOLIC BLOOD PRESSURE: 100 MMHG

## 2017-08-27 VITALS — SYSTOLIC BLOOD PRESSURE: 114 MMHG | DIASTOLIC BLOOD PRESSURE: 60 MMHG

## 2017-08-27 VITALS — SYSTOLIC BLOOD PRESSURE: 105 MMHG | DIASTOLIC BLOOD PRESSURE: 51 MMHG

## 2017-08-27 VITALS — SYSTOLIC BLOOD PRESSURE: 118 MMHG | DIASTOLIC BLOOD PRESSURE: 59 MMHG

## 2017-08-27 VITALS — DIASTOLIC BLOOD PRESSURE: 63 MMHG | SYSTOLIC BLOOD PRESSURE: 106 MMHG

## 2017-08-27 VITALS — SYSTOLIC BLOOD PRESSURE: 104 MMHG | DIASTOLIC BLOOD PRESSURE: 52 MMHG

## 2017-08-27 VITALS — SYSTOLIC BLOOD PRESSURE: 109 MMHG | DIASTOLIC BLOOD PRESSURE: 65 MMHG

## 2017-08-27 VITALS — DIASTOLIC BLOOD PRESSURE: 64 MMHG | SYSTOLIC BLOOD PRESSURE: 115 MMHG

## 2017-08-27 VITALS — SYSTOLIC BLOOD PRESSURE: 104 MMHG | DIASTOLIC BLOOD PRESSURE: 44 MMHG

## 2017-08-27 VITALS — SYSTOLIC BLOOD PRESSURE: 90 MMHG | DIASTOLIC BLOOD PRESSURE: 49 MMHG

## 2017-08-27 VITALS — SYSTOLIC BLOOD PRESSURE: 96 MMHG | DIASTOLIC BLOOD PRESSURE: 45 MMHG

## 2017-08-27 VITALS — DIASTOLIC BLOOD PRESSURE: 61 MMHG | SYSTOLIC BLOOD PRESSURE: 114 MMHG

## 2017-08-27 VITALS — SYSTOLIC BLOOD PRESSURE: 107 MMHG | DIASTOLIC BLOOD PRESSURE: 45 MMHG

## 2017-08-27 VITALS — SYSTOLIC BLOOD PRESSURE: 120 MMHG | DIASTOLIC BLOOD PRESSURE: 63 MMHG

## 2017-08-27 VITALS — DIASTOLIC BLOOD PRESSURE: 58 MMHG | SYSTOLIC BLOOD PRESSURE: 114 MMHG

## 2017-08-27 VITALS — SYSTOLIC BLOOD PRESSURE: 87 MMHG | DIASTOLIC BLOOD PRESSURE: 43 MMHG

## 2017-08-27 VITALS — DIASTOLIC BLOOD PRESSURE: 63 MMHG | SYSTOLIC BLOOD PRESSURE: 101 MMHG

## 2017-08-27 VITALS — SYSTOLIC BLOOD PRESSURE: 101 MMHG | DIASTOLIC BLOOD PRESSURE: 59 MMHG

## 2017-08-27 VITALS — SYSTOLIC BLOOD PRESSURE: 120 MMHG | DIASTOLIC BLOOD PRESSURE: 65 MMHG

## 2017-08-27 VITALS — DIASTOLIC BLOOD PRESSURE: 42 MMHG | SYSTOLIC BLOOD PRESSURE: 82 MMHG

## 2017-08-27 VITALS — SYSTOLIC BLOOD PRESSURE: 109 MMHG | DIASTOLIC BLOOD PRESSURE: 54 MMHG

## 2017-08-27 VITALS — SYSTOLIC BLOOD PRESSURE: 113 MMHG | DIASTOLIC BLOOD PRESSURE: 61 MMHG

## 2017-08-27 VITALS — DIASTOLIC BLOOD PRESSURE: 63 MMHG | SYSTOLIC BLOOD PRESSURE: 95 MMHG

## 2017-08-27 VITALS — DIASTOLIC BLOOD PRESSURE: 63 MMHG | SYSTOLIC BLOOD PRESSURE: 102 MMHG

## 2017-08-27 VITALS — DIASTOLIC BLOOD PRESSURE: 56 MMHG | SYSTOLIC BLOOD PRESSURE: 99 MMHG

## 2017-08-27 VITALS — SYSTOLIC BLOOD PRESSURE: 102 MMHG | DIASTOLIC BLOOD PRESSURE: 57 MMHG

## 2017-08-27 LAB
% BANDS: 1 % (ref 0–9)
% LYMPHS: 3 % (ref 24–48)
% MONOS: 2 % (ref 0–10)
% SEGS: 94 % (ref 35–66)
ALBUMIN SERPL-MCNC: 3.2 G/DL (ref 3.4–5)
ALBUMIN/GLOB SERPL: 1 {RATIO} (ref 1–1.7)
ALP SERPL-CCNC: 107 U/L (ref 46–116)
ALT SERPL-CCNC: 14 U/L (ref 14–59)
ANION GAP SERPL CALC-SCNC: 8 MMOL/L (ref 6–14)
AST SERPL-CCNC: 18 U/L (ref 15–37)
BASOPHILS # BLD AUTO: 0 X10^3/UL (ref 0–0.2)
BASOPHILS NFR BLD: 0 % (ref 0–3)
BILIRUB SERPL-MCNC: 0.7 MG/DL (ref 0.2–1)
BUN/CREAT SERPL: 25 (ref 6–20)
CA-I SERPL ISE-MCNC: 27 MG/DL (ref 7–20)
CALCIUM SERPL-MCNC: 8.6 MG/DL (ref 8.5–10.1)
CHLORIDE SERPL-SCNC: 106 MMOL/L (ref 98–107)
CO2 SERPL-SCNC: 26 MMOL/L (ref 21–32)
CREAT SERPL-MCNC: 1.1 MG/DL (ref 0.6–1)
DACRYOCYTES BLD QL SMEAR: (no result)
EOSINOPHIL NFR BLD: 0 % (ref 0–3)
EOSINOPHIL NFR BLD: 0 X10^3/UL (ref 0–0.7)
ERYTHROCYTE [DISTWIDTH] IN BLOOD BY AUTOMATED COUNT: 14.7 % (ref 11.5–14.5)
GFR SERPLBLD BASED ON 1.73 SQ M-ARVRAT: 47.4 ML/MIN
GLOBULIN SER-MCNC: 3.1 G/DL (ref 2.2–3.8)
GLUCOSE SERPL-MCNC: 127 MG/DL (ref 70–99)
HCT VFR BLD CALC: 33 % (ref 36–47)
HGB BLD-MCNC: 10.6 G/DL (ref 12–15.5)
LYMPHOCYTES # BLD: 0.6 X10^3/UL (ref 1–4.8)
LYMPHOCYTES NFR BLD AUTO: 4 % (ref 24–48)
MAGNESIUM SERPL-MCNC: 2.1 MG/DL (ref 1.8–2.4)
MCH RBC QN AUTO: 29 PG (ref 25–35)
MCHC RBC AUTO-ENTMCNC: 32 G/DL (ref 31–37)
MCV RBC AUTO: 92 FL (ref 79–100)
MONO #: 0.6 X10^3/UL (ref 0–1.1)
MONOCYTES NFR BLD: 4 % (ref 0–9)
NEUT #: 13.4 X10^3UL (ref 1.8–7.7)
NEUTROPHILS NFR BLD AUTO: 91 % (ref 31–73)
OVALOCYTES BLD QL SMEAR: (no result)
PLATELET # BLD AUTO: 220 X10^3/UL (ref 140–400)
PLATELET # BLD EST: ADEQUATE 10*3/UL
POLYCHROMASIA BLD QL SMEAR: SLIGHT
POTASSIUM SERPL-SCNC: 4.4 MMOL/L (ref 3.5–5.1)
PROT SERPL-MCNC: 6.3 G/DL (ref 6.4–8.2)
RBC # BLD AUTO: 3.6 X10^6/UL (ref 3.5–5.4)
SCHISTOCYTES BLD QL SMEAR: (no result)
SODIUM SERPL-SCNC: 140 MMOL/L (ref 136–145)
TARGETS BLD QL SMEAR: (no result)
WBC # BLD AUTO: 14.7 X10^3/UL (ref 4–11)

## 2017-08-27 RX ADMIN — IPRATROPIUM BROMIDE AND ALBUTEROL SULFATE SCH ML: .5; 3 SOLUTION RESPIRATORY (INHALATION) at 05:30

## 2017-08-27 RX ADMIN — CLOPIDOGREL BISULFATE SCH MG: 75 TABLET ORAL at 09:16

## 2017-08-27 RX ADMIN — SIMVASTATIN SCH MG: 5 TABLET, FILM COATED ORAL at 21:18

## 2017-08-27 RX ADMIN — GUAIFENESIN AND DEXTROMETHORPHAN HYDROBROMIDE SCH TAB: 600; 30 TABLET, EXTENDED RELEASE ORAL at 21:17

## 2017-08-27 RX ADMIN — GUAIFENESIN AND DEXTROMETHORPHAN HYDROBROMIDE SCH TAB: 600; 30 TABLET, EXTENDED RELEASE ORAL at 09:16

## 2017-08-27 RX ADMIN — METHYLPREDNISOLONE SODIUM SUCCINATE SCH MG: 40 INJECTION, POWDER, FOR SOLUTION INTRAMUSCULAR; INTRAVENOUS at 09:15

## 2017-08-27 RX ADMIN — IPRATROPIUM BROMIDE AND ALBUTEROL SULFATE SCH ML: .5; 3 SOLUTION RESPIRATORY (INHALATION) at 15:43

## 2017-08-27 RX ADMIN — ASPIRIN 81 MG SCH MG: 81 TABLET ORAL at 09:15

## 2017-08-27 RX ADMIN — RIVAROXABAN SCH MG: 10 TABLET, FILM COATED ORAL at 09:16

## 2017-08-27 RX ADMIN — IPRATROPIUM BROMIDE AND ALBUTEROL SULFATE SCH ML: .5; 3 SOLUTION RESPIRATORY (INHALATION) at 20:37

## 2017-08-27 RX ADMIN — POTASSIUM CHLORIDE SCH MEQ: 1500 TABLET, EXTENDED RELEASE ORAL at 21:17

## 2017-08-27 RX ADMIN — IPRATROPIUM BROMIDE AND ALBUTEROL SULFATE SCH ML: .5; 3 SOLUTION RESPIRATORY (INHALATION) at 09:36

## 2017-08-27 RX ADMIN — POTASSIUM CHLORIDE SCH MEQ: 1500 TABLET, EXTENDED RELEASE ORAL at 09:16

## 2017-08-27 RX ADMIN — Medication SCH MG: at 09:16

## 2017-08-27 NOTE — PN
DATE:  08/27/2017



PROBLEMS:

1.  Atrial fibrillation with rapid ventricular response - continues to become

hypotensive and continues to have wide fluctuations in her pulse.  Amiodarone

has been stopped.

2.  Acute on chronic systolic and diastolic heart failure.

3.  Acute exacerbation of chronic obstructive pulmonary disease.

4.  Renal insufficiency.

5.  Hypotension

6.  Hyperlipidemia.



SUBJECTIVE:  The patient is still doing well.  She is less short of breath and

really is anxious to go home except that we still have not resolved her atrial

fibrillation problem at the present time.



OBJECTIVE:

VITAL SIGNS:  Blood pressure 114/58, pulse 101, respirations 19, pulse ox 96% on

room air.

HEENT:  Face is slightly flushed.

HEENT:  Tongue was moist.

NECK:  Supple.

LUNGS:  With a few scattered wheezes.

CARDIOVASCULAR:  Irregular rhythm and rate.

ABDOMEN:  Soft, nontender.

EXTREMITIES:  With trace to 1+ edema.



PLAN:  Dr. Castillo is seeing the patient and will make some adjustments.  I will

decrease her steroids and hold for discharge tomorrow.





______________________________

ELIANE WOLF DO



DR:  NONI/selvin  JOB#:  [id number] / [id number]

DD:  08/27/2017 12:50  DT:  08/27/2017 19:59

## 2017-08-27 NOTE — PN
DATE:  



PROBLEMS:

1.  Atrial fibrillation with rapid ventricular response.

2.  Acute on chronic systolic and diastolic heart failure.

3.  Acute exacerbation of chronic obstructive pulmonary disease.

4.  Renal insufficiency.

5.  Hypotension.

6.  Hyperlipidemia.



SUBJECTIVE:  Doing well, doing better today, less short of breath, less

wheezing.  She remains on the ____ drip, received some digoxin per Cardiology

and her rate is still uncontrolled.



OBJECTIVE:

VITAL SIGNS:  Intake was 840 and output 1900.  Blood pressure 86/53 with a MAP

of 64, pulse 109, respirations 20, pulse ox 98% on room air.

GENERAL:  She is comfortable at rest.

NECK:  Supple.

LUNGS:  With a few scattered wheezes.

CARDIOVASCULAR:  Irregular rhythm and rate.

EXTREMITIES:  With decreased ankle edema.



PLAN:  Notify cardiology of low blood pressure.  Continue with p.o. Lasix.  We

will not change the IV steroids today.  We will continue breathing treatments.





______________________________

ELIANE WOLF DO



DR:  NONI/selvin  JOB#:  [id number] / [id number]

DD:  08/26/2017 13:59  DT:  08/26/2017 21:08

## 2017-08-27 NOTE — PDOC
SUBJECTIVE:


The patient is feeling better today.





OBJECTIVE:


Problems:


1.  recurrent atrial fibrillation with RVR - rate and BP improved. IV digoxin.  

Continue Xarelto for stroke prophylaxis.  


2.  respiratory insufficiency acute on chronic - likely multifactorial due to 

COPD and acute on chronic combined systolic and diastolic heart failure - po 

Lasix.  COPD per PCP.


3.  renal insufficiency -  monitor labs.


4.  hypotension - improving.


4.  hyperlipidemia - continue statin.


Vital Signs:





Vital Signs








  Date Time  Temp Pulse Resp B/P (MAP) Pulse Ox O2 Delivery O2 Flow Rate FiO2


 


8/27/17 15:44     96 Room Air  


 


8/27/17 15:10  91 20 90/49 (63)    


 


8/27/17 08:16 98.5       


 


8/27/17 05:30       2.0 








I & O











Intake and Output 


 


 8/27/17





 07:00


 


Intake Total 2132 ml


 


Output Total 1275 ml


 


Balance 857 ml


 


 


 


Intake Oral 1960 ml


 


IV Total 172 ml


 


Output Urine Total 1275 ml


 


# Voids 1


 


# Bowel Movements 1








Labs:





Laboratory Tests








Test


  8/25/17


16:52 8/25/17


22:45 8/26/17


05:44 8/27/17


05:20


 


Troponin I Quantitative


  < 0.017 ng/mL


(0-0.055) 0.019 ng/mL


(0-0.055) 


  


 


 


White Blood Count


  


  


  4.0 x10^3/uL


(4.0-11.0) 14.7 x10^3/uL


(4.0-11.0)


 


Red Blood Count


  


  


  3.88 x10^6/uL


(3.50-5.40) 3.60 x10^6/uL


(3.50-5.40)


 


Hemoglobin


  


  


  11.6 g/dL


(12.0-15.5) 10.6 g/dL


(12.0-15.5)


 


Hematocrit


  


  


  35.9 %


(36.0-47.0) 33.0 %


(36.0-47.0)


 


Mean Corpuscular Volume   93 fL ()  92 fL () 


 


Mean Corpuscular Hemoglobin   30 pg (25-35)  29 pg (25-35) 


 


Mean Corpuscular Hemoglobin


Concent 


  


  32 g/dL


(31-37) 32 g/dL


(31-37)


 


Red Cell Distribution Width


  


  


  15.0 %


(11.5-14.5) 14.7 %


(11.5-14.5)


 


Platelet Count


  


  


  234 x10^3/uL


(140-400) 220 x10^3/uL


(140-400)


 


Neutrophils (%) (Auto)   84 % (31-73)  91 % (31-73) 


 


Lymphocytes (%) (Auto)   13 % (24-48)  4 % (24-48) 


 


Monocytes (%) (Auto)   3 % (0-9)  4 % (0-9) 


 


Eosinophils (%) (Auto)   0 % (0-3)  0 % (0-3) 


 


Basophils (%) (Auto)   0 % (0-3)  0 % (0-3) 


 


Neutrophils # (Auto)


  


  


  3.4 x10^3uL


(1.8-7.7) 13.4 x10^3uL


(1.8-7.7)


 


Lymphocytes # (Auto)


  


  


  0.5 x10^3/uL


(1.0-4.8) 0.6 x10^3/uL


(1.0-4.8)


 


Monocytes # (Auto)


  


  


  0.1 x10^3/uL


(0.0-1.1) 0.6 x10^3/uL


(0.0-1.1)


 


Eosinophils # (Auto)


  


  


  0.0 x10^3/uL


(0.0-0.7) 0.0 x10^3/uL


(0.0-0.7)


 


Basophils # (Auto)


  


  


  0.0 x10^3/uL


(0.0-0.2) 0.0 x10^3/uL


(0.0-0.2)


 


Sodium Level


  


  


  138 mmol/L


(136-145) 140 mmol/L


(136-145)


 


Potassium Level


  


  


  4.0 mmol/L


(3.5-5.1) 4.4 mmol/L


(3.5-5.1)


 


Chloride Level


  


  


  104 mmol/L


() 106 mmol/L


()


 


Carbon Dioxide Level


  


  


  26 mmol/L


(21-32) 26 mmol/L


(21-32)


 


Anion Gap   8 (6-14)  8 (6-14) 


 


Blood Urea Nitrogen


  


  


  23 mg/dL


(7-20) 27 mg/dL


(7-20)


 


Creatinine


  


  


  1.4 mg/dL


(0.6-1.0) 1.1 mg/dL


(0.6-1.0)


 


Estimated GFR


(Cockcroft-Gault) 


  


  35.9 


  47.4 


 


 


BUN/Creatinine Ratio   16 (6-20)  25 (6-20) 


 


Glucose Level


  


  


  187 mg/dL


(70-99) 127 mg/dL


(70-99)


 


Calcium Level


  


  


  9.0 mg/dL


(8.5-10.1) 8.6 mg/dL


(8.5-10.1)


 


Magnesium Level


  


  


  1.9 mg/dL


(1.8-2.4) 2.1 mg/dL


(1.8-2.4)


 


Total Bilirubin


  


  


  1.1 mg/dL


(0.2-1.0) 0.7 mg/dL


(0.2-1.0)


 


Aspartate Amino Transf


(AST/SGOT) 


  


  18 U/L (15-37) 


  18 U/L (15-37) 


 


 


Alanine Aminotransferase


(ALT/SGPT) 


  


  16 U/L (14-59) 


  14 U/L (14-59) 


 


 


Alkaline Phosphatase


  


  


  124 U/L


() 107 U/L


()


 


Total Protein


  


  


  7.0 g/dL


(6.4-8.2) 6.3 g/dL


(6.4-8.2)


 


Albumin


  


  


  3.4 g/dL


(3.4-5.0) 3.2 g/dL


(3.4-5.0)


 


Albumin/Globulin Ratio   0.9 (1.0-1.7)  1.0 (1.0-1.7) 


 


Segmented Neutrophils %    94 % (35-66) 


 


Band Neutrophils %    1 % (0-9) 


 


Lymphocytes %    3 % (24-48) 


 


Monocytes %    2 % (0-10) 


 


Platelet Estimate


  


  


  


  Adequate


(ADEQUATE)


 


Polychromasia    Slight 


 


Target Cells    Occ 


 


Tear Drop Cells    Occ 


 


Ovalocytes    Few 


 


Schistocytes    Occ 








Physical Exam:


Chest: clear.


CV: irreg. irreg.


Abdomen: soft.





ASSESSMENT:


As above











LILLY WHITE MD Aug 27, 2017 16:09

## 2017-08-28 VITALS — DIASTOLIC BLOOD PRESSURE: 67 MMHG | SYSTOLIC BLOOD PRESSURE: 116 MMHG

## 2017-08-28 VITALS — DIASTOLIC BLOOD PRESSURE: 71 MMHG | SYSTOLIC BLOOD PRESSURE: 117 MMHG

## 2017-08-28 VITALS — DIASTOLIC BLOOD PRESSURE: 74 MMHG | SYSTOLIC BLOOD PRESSURE: 120 MMHG

## 2017-08-28 VITALS — DIASTOLIC BLOOD PRESSURE: 43 MMHG | SYSTOLIC BLOOD PRESSURE: 95 MMHG

## 2017-08-28 VITALS — SYSTOLIC BLOOD PRESSURE: 126 MMHG | DIASTOLIC BLOOD PRESSURE: 61 MMHG

## 2017-08-28 VITALS — SYSTOLIC BLOOD PRESSURE: 110 MMHG | DIASTOLIC BLOOD PRESSURE: 65 MMHG

## 2017-08-28 VITALS — DIASTOLIC BLOOD PRESSURE: 61 MMHG | SYSTOLIC BLOOD PRESSURE: 105 MMHG

## 2017-08-28 VITALS — SYSTOLIC BLOOD PRESSURE: 118 MMHG | DIASTOLIC BLOOD PRESSURE: 57 MMHG

## 2017-08-28 VITALS — SYSTOLIC BLOOD PRESSURE: 118 MMHG | DIASTOLIC BLOOD PRESSURE: 69 MMHG

## 2017-08-28 VITALS — DIASTOLIC BLOOD PRESSURE: 65 MMHG | SYSTOLIC BLOOD PRESSURE: 114 MMHG

## 2017-08-28 VITALS — SYSTOLIC BLOOD PRESSURE: 116 MMHG | DIASTOLIC BLOOD PRESSURE: 60 MMHG

## 2017-08-28 VITALS — DIASTOLIC BLOOD PRESSURE: 63 MMHG | SYSTOLIC BLOOD PRESSURE: 120 MMHG

## 2017-08-28 VITALS — SYSTOLIC BLOOD PRESSURE: 116 MMHG | DIASTOLIC BLOOD PRESSURE: 65 MMHG

## 2017-08-28 LAB
ALBUMIN SERPL-MCNC: 3.1 G/DL (ref 3.4–5)
ALBUMIN/GLOB SERPL: 1 {RATIO} (ref 1–1.7)
ALP SERPL-CCNC: 110 U/L (ref 46–116)
ALT SERPL-CCNC: 17 U/L (ref 14–59)
ANION GAP SERPL CALC-SCNC: 5 MMOL/L (ref 6–14)
AST SERPL-CCNC: 19 U/L (ref 15–37)
BASOPHILS # BLD AUTO: 0 X10^3/UL (ref 0–0.2)
BASOPHILS NFR BLD: 0 % (ref 0–3)
BILIRUB SERPL-MCNC: 0.8 MG/DL (ref 0.2–1)
BUN/CREAT SERPL: 25 (ref 6–20)
CA-I SERPL ISE-MCNC: 27 MG/DL (ref 7–20)
CALCIUM SERPL-MCNC: 8.5 MG/DL (ref 8.5–10.1)
CHLORIDE SERPL-SCNC: 106 MMOL/L (ref 98–107)
CO2 SERPL-SCNC: 29 MMOL/L (ref 21–32)
CREAT SERPL-MCNC: 1.1 MG/DL (ref 0.6–1)
EOSINOPHIL NFR BLD: 0 % (ref 0–3)
EOSINOPHIL NFR BLD: 0 X10^3/UL (ref 0–0.7)
ERYTHROCYTE [DISTWIDTH] IN BLOOD BY AUTOMATED COUNT: 15.3 % (ref 11.5–14.5)
GFR SERPLBLD BASED ON 1.73 SQ M-ARVRAT: 47.4 ML/MIN
GLOBULIN SER-MCNC: 3 G/DL (ref 2.2–3.8)
GLUCOSE SERPL-MCNC: 83 MG/DL (ref 70–99)
HCT VFR BLD CALC: 33.7 % (ref 36–47)
HGB BLD-MCNC: 10.7 G/DL (ref 12–15.5)
LYMPHOCYTES # BLD: 1.5 X10^3/UL (ref 1–4.8)
LYMPHOCYTES NFR BLD AUTO: 12 % (ref 24–48)
MAGNESIUM SERPL-MCNC: 2 MG/DL (ref 1.8–2.4)
MCH RBC QN AUTO: 30 PG (ref 25–35)
MCHC RBC AUTO-ENTMCNC: 32 G/DL (ref 31–37)
MCV RBC AUTO: 93 FL (ref 79–100)
MONO #: 1.4 X10^3/UL (ref 0–1.1)
MONOCYTES NFR BLD: 12 % (ref 0–9)
NEUT #: 9.3 X10^3UL (ref 1.8–7.7)
NEUTROPHILS NFR BLD AUTO: 76 % (ref 31–73)
PLATELET # BLD AUTO: 255 X10^3/UL (ref 140–400)
POTASSIUM SERPL-SCNC: 4.3 MMOL/L (ref 3.5–5.1)
PROT SERPL-MCNC: 6.1 G/DL (ref 6.4–8.2)
RBC # BLD AUTO: 3.62 X10^6/UL (ref 3.5–5.4)
SODIUM SERPL-SCNC: 140 MMOL/L (ref 136–145)
WBC # BLD AUTO: 12.4 X10^3/UL (ref 4–11)

## 2017-08-28 RX ADMIN — POTASSIUM CHLORIDE SCH MEQ: 1500 TABLET, EXTENDED RELEASE ORAL at 20:38

## 2017-08-28 RX ADMIN — IPRATROPIUM BROMIDE AND ALBUTEROL SULFATE SCH ML: .5; 3 SOLUTION RESPIRATORY (INHALATION) at 05:17

## 2017-08-28 RX ADMIN — GUAIFENESIN AND DEXTROMETHORPHAN HYDROBROMIDE SCH TAB: 600; 30 TABLET, EXTENDED RELEASE ORAL at 20:37

## 2017-08-28 RX ADMIN — GUAIFENESIN AND DEXTROMETHORPHAN HYDROBROMIDE SCH TAB: 600; 30 TABLET, EXTENDED RELEASE ORAL at 09:19

## 2017-08-28 RX ADMIN — IPRATROPIUM BROMIDE AND ALBUTEROL SULFATE SCH ML: .5; 3 SOLUTION RESPIRATORY (INHALATION) at 19:51

## 2017-08-28 RX ADMIN — METHYLPREDNISOLONE SODIUM SUCCINATE SCH MG: 40 INJECTION, POWDER, FOR SOLUTION INTRAMUSCULAR; INTRAVENOUS at 09:19

## 2017-08-28 RX ADMIN — Medication SCH MG: at 09:19

## 2017-08-28 RX ADMIN — IPRATROPIUM BROMIDE AND ALBUTEROL SULFATE SCH ML: .5; 3 SOLUTION RESPIRATORY (INHALATION) at 16:46

## 2017-08-28 RX ADMIN — AMIODARONE HYDROCHLORIDE SCH MG: 200 TABLET ORAL at 10:21

## 2017-08-28 RX ADMIN — POTASSIUM CHLORIDE SCH MEQ: 1500 TABLET, EXTENDED RELEASE ORAL at 09:19

## 2017-08-28 RX ADMIN — RIVAROXABAN SCH MG: 10 TABLET, FILM COATED ORAL at 09:20

## 2017-08-28 RX ADMIN — CLOPIDOGREL BISULFATE SCH MG: 75 TABLET ORAL at 09:20

## 2017-08-28 RX ADMIN — SIMVASTATIN SCH MG: 5 TABLET, FILM COATED ORAL at 20:37

## 2017-08-28 RX ADMIN — IPRATROPIUM BROMIDE AND ALBUTEROL SULFATE SCH ML: .5; 3 SOLUTION RESPIRATORY (INHALATION) at 11:33

## 2017-08-28 RX ADMIN — ASPIRIN 81 MG SCH MG: 81 TABLET ORAL at 09:19

## 2017-08-28 NOTE — PDOC
PROGRESS NOTES


Assessment





1.  recurrent atrial fibrillation with RVR - off amiodarone since early am due 

to need for dopamine.  rate remains with only fair control.  Resume oral 

amiodarone.  Continue Xarelto for stroke prophylaxis.  


2.  respiratory insufficiency acute on chronic - likely multifactorial due to 

COPD and acute on chronic combined systolic and diastolic heart failure - 

medical mgmt, No overt heart failure today.  no beta blocker due to COPD.  No 

ACEI/ARB due to hypotension.  


     COPD per PCP.  On steroids and breathing treatments.


3.  renal insufficiency - Cr WNL


4.  hypotension - mild.  required dopamine briefly yesterday.  no pressors now.

  check bilateral arm pressures.  


4.  hyperlipidemia - continue statin


Problems:  


Subjective


feeling better, still mild shortness of breath, especially with ambulation.  no 

chest pain.  no palpitations, lightheadedness.


Objective


tele - atrial fibrillation with controlled ventricular response





Vital Signs








  Date Time  Temp Pulse Resp B/P (MAP) Pulse Ox O2 Delivery O2 Flow Rate FiO2


 


8/28/17 07:16  106 25 116/67 (83) 95 Room Air  


 


8/28/17 05:21 98.6       


 


8/27/17 05:30       2.0 














Intake and Output 


 


 8/28/17





 07:00


 


Intake Total 1730 ml


 


Output Total 1450 ml


 


Balance 280 ml


 


 


 


Intake Oral 1730 ml


 


Output Urine Total 1450 ml


 


# Bowel Movements 1








Abdomen:  Normal bowel sounds, Soft, No tenderness


Heart:  Other (IRR, no gallops, clicks or rubs)


Extremities:  No clubbing, No cyanosis, Normal pulses


General:  Alert, Oriented X3, Cooperative, No acute distress


HEENT:  EOMI, Mucous membr. moist/pink


Lungs:  Other (improving but remains decreased with scattered expiratory wheezes

)


Neuro:  Normal speech, Strength at 5/5 X4 ext


Psych/Mental Status:  Mental status NL, Mood NL


Review of Relevant


I have reviewed the following items kandice (where applicable) has been applied.


Labs





Laboratory Tests








Test


  8/27/17


05:20 8/28/17


07:49


 


White Blood Count


  14.7 x10^3/uL


(4.0-11.0) 12.4 x10^3/uL


(4.0-11.0)


 


Red Blood Count


  3.60 x10^6/uL


(3.50-5.40) 3.62 x10^6/uL


(3.50-5.40)


 


Hemoglobin


  10.6 g/dL


(12.0-15.5) 10.7 g/dL


(12.0-15.5)


 


Hematocrit


  33.0 %


(36.0-47.0) 33.7 %


(36.0-47.0)


 


Mean Corpuscular Volume 92 fL ()  93 fL () 


 


Mean Corpuscular Hemoglobin 29 pg (25-35)  30 pg (25-35) 


 


Mean Corpuscular Hemoglobin


Concent 32 g/dL


(31-37) 32 g/dL


(31-37)


 


Red Cell Distribution Width


  14.7 %


(11.5-14.5) 15.3 %


(11.5-14.5)


 


Platelet Count


  220 x10^3/uL


(140-400) 255 x10^3/uL


(140-400)


 


Neutrophils (%) (Auto) 91 % (31-73)  76 % (31-73) 


 


Lymphocytes (%) (Auto) 4 % (24-48)  12 % (24-48) 


 


Monocytes (%) (Auto) 4 % (0-9)  12 % (0-9) 


 


Eosinophils (%) (Auto) 0 % (0-3)  0 % (0-3) 


 


Basophils (%) (Auto) 0 % (0-3)  0 % (0-3) 


 


Neutrophils # (Auto)


  13.4 x10^3uL


(1.8-7.7) 9.3 x10^3uL


(1.8-7.7)


 


Lymphocytes # (Auto)


  0.6 x10^3/uL


(1.0-4.8) 1.5 x10^3/uL


(1.0-4.8)


 


Monocytes # (Auto)


  0.6 x10^3/uL


(0.0-1.1) 1.4 x10^3/uL


(0.0-1.1)


 


Eosinophils # (Auto)


  0.0 x10^3/uL


(0.0-0.7) 0.0 x10^3/uL


(0.0-0.7)


 


Basophils # (Auto)


  0.0 x10^3/uL


(0.0-0.2) 0.0 x10^3/uL


(0.0-0.2)


 


Segmented Neutrophils % 94 % (35-66)  


 


Band Neutrophils % 1 % (0-9)  


 


Lymphocytes % 3 % (24-48)  


 


Monocytes % 2 % (0-10)  


 


Platelet Estimate


  Adequate


(ADEQUATE) 


 


 


Polychromasia Slight  


 


Target Cells Occ  


 


Tear Drop Cells Occ  


 


Ovalocytes Few  


 


Schistocytes Occ  


 


Sodium Level


  140 mmol/L


(136-145) 140 mmol/L


(136-145)


 


Potassium Level


  4.4 mmol/L


(3.5-5.1) 4.3 mmol/L


(3.5-5.1)


 


Chloride Level


  106 mmol/L


() 106 mmol/L


()


 


Carbon Dioxide Level


  26 mmol/L


(21-32) 29 mmol/L


(21-32)


 


Anion Gap 8 (6-14)  5 (6-14) 


 


Blood Urea Nitrogen


  27 mg/dL


(7-20) 27 mg/dL


(7-20)


 


Creatinine


  1.1 mg/dL


(0.6-1.0) 1.1 mg/dL


(0.6-1.0)


 


Estimated GFR


(Cockcroft-Gault) 47.4 


  47.4 


 


 


BUN/Creatinine Ratio 25 (6-20)  25 (6-20) 


 


Glucose Level


  127 mg/dL


(70-99) 83 mg/dL


(70-99)


 


Calcium Level


  8.6 mg/dL


(8.5-10.1) 8.5 mg/dL


(8.5-10.1)


 


Magnesium Level


  2.1 mg/dL


(1.8-2.4) 2.0 mg/dL


(1.8-2.4)


 


Total Bilirubin


  0.7 mg/dL


(0.2-1.0) 0.8 mg/dL


(0.2-1.0)


 


Aspartate Amino Transf


(AST/SGOT) 18 U/L (15-37) 


  19 U/L (15-37) 


 


 


Alanine Aminotransferase


(ALT/SGPT) 14 U/L (14-59) 


  17 U/L (14-59) 


 


 


Alkaline Phosphatase


  107 U/L


() 110 U/L


()


 


Total Protein


  6.3 g/dL


(6.4-8.2) 6.1 g/dL


(6.4-8.2)


 


Albumin


  3.2 g/dL


(3.4-5.0) 3.1 g/dL


(3.4-5.0)


 


Albumin/Globulin Ratio 1.0 (1.0-1.7)  1.0 (1.0-1.7) 








Medications





Current Medications


Sodium Chloride 1,000 ml @  500 mls/hr 1X  ONCE IV ;  Start 8/25/17 at 10:45;  

Stop 8/25/17 at 12:44;  Status DC


Diltiazem HCl (Cardizem) 10 mg 1X  ONCE IVP  Last administered on 8/25/17at 11:

01;  Start 8/25/17 at 10:40;  Stop 8/25/17 at 10:41;  Status DC


Diltiazem HCl 125 mg/Dextrose 125 ml @ 0 mls/hr 1X  ONCE IV  Last administered 

on 8/25/17at 11:03;  Start 8/25/17 at 10:45;  Stop 8/25/17 at 10:46;  Status DC


Dextrose 100 ml @  As Directed STK-MED ONCE IV ;  Start 8/25/17 at 10:53;  Stop 

8/25/17 at 10:54;  Status DC


Diltiazem HCl (Cardizem) 125 mg STK-MED ONCE IV ;  Start 8/25/17 at 10:54;  

Stop 8/25/17 at 10:55;  Status DC


Amiodarone HCl 900 mg/Dextrose 518 ml @ 0 mls/hr 1X  ONCE IV  Last administered 

on 8/25/17at 12:05;  Start 8/25/17 at 11:45;  Stop 8/25/17 at 11:46;  Status DC


Amiodarone HCl 900 mg/Dextrose 518 ml @ 0 mls/hr 1X  ONCE IV ;  Start 8/25/17 

at 11:45;  Stop 8/25/17 at 11:45;  Status DC


Ondansetron HCl (Zofran) 4 mg PRN Q8HRS  PRN IV NAUSEA/VOMITING;  Start 8/25/17 

at 13:45


Furosemide (Lasix) 40 mg DAILY IVP  Last administered on 8/25/17at 15:56;  

Start 8/25/17 at 15:00;  Stop 8/26/17 at 08:45;  Status DC


Amiodarone HCl 900 mg/Dextrose 518 ml @ 0 mls/hr CONT  PRN IV SEE I/O RECORD 

Last administered on 8/25/17at 15:27;  Start 8/25/17 at 15:00;  Stop 8/25/17 at 

15:27;  Status DC


Albuterol/ Ipratropium (Duoneb) 3 ml RTQID NEB  Last administered on 8/28/17at 

05:17;  Start 8/25/17 at 16:00


Guaifenesin (MUCINEX ER with DM) 1 tab BID PO  Last administered on 8/27/17at 21

:17;  Start 8/25/17 at 16:20


Methylprednisolone Sodium Succinate (SOLU-Medrol 40MG VIAL) 40 mg Q12HR IV  

Last administered on 8/27/17at 09:15;  Start 8/25/17 at 16:20;  Stop 8/27/17 at 

09:55;  Status DC


Aspirin (Children'S Aspirin) 81 mg DAILY PO  Last administered on 8/27/17at 09:

15;  Start 8/26/17 at 09:00


Clopidogrel Bisulfate (Plavix) 75 mg DAILY PO  Last administered on 8/27/17at 09

:16;  Start 8/26/17 at 09:00


Simvastatin (Zocor) 5 mg HS PO  Last administered on 8/27/17at 21:18;  Start 8/ 25/17 at 21:00


Potassium Chloride (Klor-Con) 20 meq BID PO  Last administered on 8/27/17at 21:

17;  Start 8/25/17 at 21:00


Rivaroxaban (Xarelto) 20 mg DAILY PO  Last administered on 8/27/17at 09:16;  

Start 8/26/17 at 09:00


Levothyroxine Sodium (Synthroid) 25 mcg DAILY07 PO  Last administered on 8/26/ 17at 09:13;  Start 8/26/17 at 08:45;  Stop 8/26/17 at 10:09;  Status DC


Furosemide (Lasix) 40 mg DAILY PO  Last administered on 8/27/17at 09:16;  Start 

8/26/17 at 09:00


Digoxin (Lanoxin) 500 mcg 1X  ONCE IV  Last administered on 8/26/17at 09:14;  

Start 8/26/17 at 09:00;  Stop 8/26/17 at 09:01;  Status DC


Amiodarone HCl 450 mg/Dextrose 259 ml @  17.26 mls/ hr CONT  PRN IV SEE I/O 

RECORD Last administered on 8/26/17at 11:45;  Start 8/26/17 at 12:00;  Stop 8/26 /17 at 15:42;  Status DC


Dopamine HCl/ Dextrose 250 ml @  16.84 mls/ hr CONT  PRN IV SEE I/O RECORD Last 

administered on 8/26/17at 18:30;  Start 8/26/17 at 16:00


Methylprednisolone Sodium Succinate (SOLU-Medrol 40MG VIAL) 40 mg DAILY IV ;  

Start 8/28/17 at 09:00


Digoxin (Lanoxin) 250 mcg 1X  ONCE IV  Last administered on 8/27/17at 10:37;  

Start 8/27/17 at 10:30;  Stop 8/27/17 at 10:31;  Status DC





Active Scripts


Active


Reported


Plavix (Clopidogrel Bisulfate) 75 Mg Tablet 75 Mg PO DAILY


Xarelto (Rivaroxaban) 20 Mg Tablet 20 Mg PO DAILY


Aspirin 81 Mg Tab.chew 81 Mg PO DAILY


Lasix (Furosemide) 40 Mg Tablet 1 Tab PO DAILY


K-Tab ER (Potassium Chloride) 20 Meq Tablet.er 20 Meq PO BID


Simvastatin 5 Mg Tablet 5 Mg PO HS


Multi-Vitamin Daily (Multivitamin) 1 Each Tablet 1 Each PO


Vitals/I & O





Vital Sign - Last 24 Hours








 8/27/17 8/27/17 8/27/17 8/27/17





 09:37 10:10 10:37 11:14


 


Pulse  102 102 102


 


Resp  19  19


 


B/P (MAP)  104/44 (64) 104/44 96/45 (62)


 


Pulse Ox 97 97  97


 


O2 Delivery Room Air Room Air  Room Air





 8/27/17 8/27/17 8/27/17 8/27/17





 12:00 12:10 13:10 13:37


 


Pulse  101 103 97


 


Resp  19 19 17


 


B/P (MAP)  114/58 (76) 87/43 (58) 105/51 (69)


 


Pulse Ox  96 97 97


 


O2 Delivery Room Air Room Air Room Air Room Air





 8/27/17 8/27/17 8/27/17 8/27/17





 14:10 14:48 15:10 15:44


 


Pulse 94 97 91 


 


Resp 21 22 20 


 


B/P (MAP) 82/42 (55) 101/59 (73) 90/49 (63) 


 


Pulse Ox 96 96 96 96


 


O2 Delivery Room Air Room Air Room Air Room Air





 8/27/17 8/27/17 8/27/17 8/27/17





 16:00 16:16 17:17 18:00


 


Pulse  109 107 110


 


Resp  20 20 20


 


B/P (MAP)  107/45 (65) 109/54 (72) 104/52 (69)


 


Pulse Ox  98 97 97


 


O2 Delivery Room Air Room Air Room Air Room Air





 8/27/17 8/27/17 8/27/17 8/27/17





 19:16 19:34 20:00 20:08


 


Temp  98.7  


 


Pulse 122 112  111


 


Resp 23 23  23


 


B/P (MAP) 106/63 (77) 120/65 (83)  115/64 (81)


 


Pulse Ox 97 97  97


 


O2 Delivery Room Air Room Air Room Air Room Air


 


    





 8/27/17 8/27/17 8/27/17 8/27/17





 20:39 21:36 22:21 23:24


 


Pulse  90 96 94


 


Resp  20 16 18


 


B/P (MAP)  114/60 (78) 102/63 (76) 118/59 (78)


 


Pulse Ox 96 98 96 96


 


O2 Delivery Room Air Room Air Room Air Room Air





 8/28/17 8/28/17 8/28/17 8/28/17





 00:00 00:16 01:14 02:21


 


Pulse  94 84 93


 


Resp  18 16 14


 


B/P (MAP)  117/71 (86) 110/65 (80) 126/61 (82)


 


Pulse Ox  98 97 98


 


O2 Delivery Room Air Room Air Room Air Room Air





 8/28/17 8/28/17 8/28/17 8/28/17





 03:17 03:41 04:15 05:18


 


Pulse 90  92 


 


Resp 15  18 


 


B/P (MAP) 105/61 (76)  120/74 (89) 


 


Pulse Ox 96  96 97


 


O2 Delivery Room Air Room Air Room Air Room Air





 8/28/17 8/28/17 8/28/17 





 05:21 05:45 07:16 


 


Temp 98.6   


 


Pulse 92 114 106 


 


Resp 14  25 


 


B/P (MAP) 116/65 (82)  116/67 (83) 


 


Pulse Ox 100  95 


 


O2 Delivery Room Air  Room Air 














Intake and Output   


 


 8/27/17 8/27/17 8/28/17





 15:00 23:00 07:00


 


Intake Total 720 ml 510 ml 500 ml


 


Output Total 400 ml 650 ml 400 ml


 


Balance 320 ml -140 ml 100 ml

















ODETTE HANKS Aug 28, 2017 09:17

## 2017-08-29 VITALS — SYSTOLIC BLOOD PRESSURE: 116 MMHG | DIASTOLIC BLOOD PRESSURE: 58 MMHG

## 2017-08-29 VITALS — SYSTOLIC BLOOD PRESSURE: 117 MMHG | DIASTOLIC BLOOD PRESSURE: 62 MMHG

## 2017-08-29 VITALS — SYSTOLIC BLOOD PRESSURE: 95 MMHG | DIASTOLIC BLOOD PRESSURE: 55 MMHG

## 2017-08-29 LAB
ALBUMIN SERPL-MCNC: 3.3 G/DL (ref 3.4–5)
ALBUMIN/GLOB SERPL: 1 {RATIO} (ref 1–1.7)
ALP SERPL-CCNC: 119 U/L (ref 46–116)
ALT SERPL-CCNC: 21 U/L (ref 14–59)
ANION GAP SERPL CALC-SCNC: 4 MMOL/L (ref 6–14)
AST SERPL-CCNC: 17 U/L (ref 15–37)
BILIRUB SERPL-MCNC: 0.9 MG/DL (ref 0.2–1)
BUN/CREAT SERPL: 21 (ref 6–20)
CA-I SERPL ISE-MCNC: 25 MG/DL (ref 7–20)
CALCIUM SERPL-MCNC: 9.2 MG/DL (ref 8.5–10.1)
CHLORIDE SERPL-SCNC: 105 MMOL/L (ref 98–107)
CO2 SERPL-SCNC: 32 MMOL/L (ref 21–32)
CREAT SERPL-MCNC: 1.2 MG/DL (ref 0.6–1)
ERYTHROCYTE [DISTWIDTH] IN BLOOD BY AUTOMATED COUNT: 15.3 % (ref 11.5–14.5)
GFR SERPLBLD BASED ON 1.73 SQ M-ARVRAT: 42.9 ML/MIN
GLOBULIN SER-MCNC: 3.3 G/DL (ref 2.2–3.8)
GLUCOSE SERPL-MCNC: 119 MG/DL (ref 70–99)
HCT VFR BLD CALC: 37.6 % (ref 36–47)
HGB BLD-MCNC: 11.9 G/DL (ref 12–15.5)
MCH RBC QN AUTO: 29 PG (ref 25–35)
MCHC RBC AUTO-ENTMCNC: 32 G/DL (ref 31–37)
MCV RBC AUTO: 93 FL (ref 79–100)
PLATELET # BLD AUTO: 275 X10^3/UL (ref 140–400)
POTASSIUM SERPL-SCNC: 4.8 MMOL/L (ref 3.5–5.1)
PROT SERPL-MCNC: 6.6 G/DL (ref 6.4–8.2)
RBC # BLD AUTO: 4.03 X10^6/UL (ref 3.5–5.4)
SODIUM SERPL-SCNC: 141 MMOL/L (ref 136–145)
WBC # BLD AUTO: 10 X10^3/UL (ref 4–11)

## 2017-08-29 RX ADMIN — METHYLPREDNISOLONE SODIUM SUCCINATE SCH MG: 40 INJECTION, POWDER, FOR SOLUTION INTRAMUSCULAR; INTRAVENOUS at 08:35

## 2017-08-29 RX ADMIN — ASPIRIN 81 MG SCH MG: 81 TABLET ORAL at 08:36

## 2017-08-29 RX ADMIN — POTASSIUM CHLORIDE SCH MEQ: 1500 TABLET, EXTENDED RELEASE ORAL at 08:36

## 2017-08-29 RX ADMIN — IPRATROPIUM BROMIDE AND ALBUTEROL SULFATE SCH ML: .5; 3 SOLUTION RESPIRATORY (INHALATION) at 09:14

## 2017-08-29 RX ADMIN — GUAIFENESIN AND DEXTROMETHORPHAN HYDROBROMIDE SCH TAB: 600; 30 TABLET, EXTENDED RELEASE ORAL at 08:35

## 2017-08-29 RX ADMIN — RIVAROXABAN SCH MG: 10 TABLET, FILM COATED ORAL at 08:35

## 2017-08-29 RX ADMIN — IPRATROPIUM BROMIDE AND ALBUTEROL SULFATE SCH ML: .5; 3 SOLUTION RESPIRATORY (INHALATION) at 04:06

## 2017-08-29 RX ADMIN — CLOPIDOGREL BISULFATE SCH MG: 75 TABLET ORAL at 08:35

## 2017-08-29 RX ADMIN — Medication SCH MG: at 08:36

## 2017-08-29 RX ADMIN — AMIODARONE HYDROCHLORIDE SCH MG: 200 TABLET ORAL at 08:36

## 2017-08-29 NOTE — PDOC
PROGRESS NOTES


Assessment


1.  recurrent atrial fibrillation with RVR - Rate controlled on oral 

amiodarone.  Continue Xarelto for stroke prophylaxis.  


2.  respiratory insufficiency acute on chronic - likely multifactorial due to 

COPD and acute on chronic combined systolic and diastolic heart failure - 

Euvolemic.  No beta blocker at this time due to COPD and hypotension.  No ACEI/

ARB secondary to hypotension.  COPD improved, mgmt per PCP.


3.  renal insufficiency - Cr WNL


4.  hypotension - mild.  required dopamine briefly Sunday.  no pressors now.  

Blood pressure controlled.  Runs low normal.


4.  hyperlipidemia - continue statin





OK to discharge from [facility] perspective.  MCOT for AF burden and rate control.  

follow up in 4 weeks with Dr Luna.


Problems:  


Subjective


Feels much better.  No shortness of breath, no palpitations, no lightheadedness.


Objective





Vital Signs








  Date Time  Temp Pulse Resp B/P (MAP) Pulse Ox O2 Delivery O2 Flow Rate FiO2


 


8/29/17 11:15 97.4 84 24 95/55 (68) 95 Room Air  


 


8/27/17 05:30       2.0 














Intake and Output 


 


 8/30/17





 07:00


 


Intake Total 680 ml


 


Output Total 500 ml


 


Balance 180 ml


 


 


 


Intake Oral 680 ml


 


Output Urine Total 500 ml


 


# Voids 1








Abdomen:  Normal bowel sounds, Soft, No tenderness


Heart:  Other (IRR, no gallops, clicks or rubs.  Rate controlled)


Extremities:  No clubbing, No cyanosis, Normal pulses


General:  Alert, Oriented X3, Cooperative, No acute distress


HEENT:  Atraumatic, EOMI


Lungs:  Other (mildly decreased, no crackles, wheezes or rhonchi)


Neuro:  Normal speech


Psych/Mental Status:  Mental status NL, Mood NL


Review of Relevant


I have reviewed the following items kandice (where applicable) has been applied.


Labs





Laboratory Tests








Test


  8/28/17


07:49 8/29/17


08:32


 


White Blood Count


  12.4 x10^3/uL


(4.0-11.0) 10.0 x10^3/uL


(4.0-11.0)


 


Red Blood Count


  3.62 x10^6/uL


(3.50-5.40) 4.03 x10^6/uL


(3.50-5.40)


 


Hemoglobin


  10.7 g/dL


(12.0-15.5) 11.9 g/dL


(12.0-15.5)


 


Hematocrit


  33.7 %


(36.0-47.0) 37.6 %


(36.0-47.0)


 


Mean Corpuscular Volume 93 fL ()  93 fL () 


 


Mean Corpuscular Hemoglobin 30 pg (25-35)  29 pg (25-35) 


 


Mean Corpuscular Hemoglobin


Concent 32 g/dL


(31-37) 32 g/dL


(31-37)


 


Red Cell Distribution Width


  15.3 %


(11.5-14.5) 15.3 %


(11.5-14.5)


 


Platelet Count


  255 x10^3/uL


(140-400) 275 x10^3/uL


(140-400)


 


Neutrophils (%) (Auto) 76 % (31-73)  


 


Lymphocytes (%) (Auto) 12 % (24-48)  


 


Monocytes (%) (Auto) 12 % (0-9)  


 


Eosinophils (%) (Auto) 0 % (0-3)  


 


Basophils (%) (Auto) 0 % (0-3)  


 


Neutrophils # (Auto)


  9.3 x10^3uL


(1.8-7.7) 


 


 


Lymphocytes # (Auto)


  1.5 x10^3/uL


(1.0-4.8) 


 


 


Monocytes # (Auto)


  1.4 x10^3/uL


(0.0-1.1) 


 


 


Eosinophils # (Auto)


  0.0 x10^3/uL


(0.0-0.7) 


 


 


Basophils # (Auto)


  0.0 x10^3/uL


(0.0-0.2) 


 


 


Sodium Level


  140 mmol/L


(136-145) 141 mmol/L


(136-145)


 


Potassium Level


  4.3 mmol/L


(3.5-5.1) 4.8 mmol/L


(3.5-5.1)


 


Chloride Level


  106 mmol/L


() 105 mmol/L


()


 


Carbon Dioxide Level


  29 mmol/L


(21-32) 32 mmol/L


(21-32)


 


Anion Gap 5 (6-14)  4 (6-14) 


 


Blood Urea Nitrogen


  27 mg/dL


(7-20) 25 mg/dL


(7-20)


 


Creatinine


  1.1 mg/dL


(0.6-1.0) 1.2 mg/dL


(0.6-1.0)


 


Estimated GFR


(Cockcroft-Gault) 47.4 


  42.9 


 


 


BUN/Creatinine Ratio 25 (6-20)  21 (6-20) 


 


Glucose Level


  83 mg/dL


(70-99) 119 mg/dL


(70-99)


 


Calcium Level


  8.5 mg/dL


(8.5-10.1) 9.2 mg/dL


(8.5-10.1)


 


Magnesium Level


  2.0 mg/dL


(1.8-2.4) 


 


 


Total Bilirubin


  0.8 mg/dL


(0.2-1.0) 0.9 mg/dL


(0.2-1.0)


 


Aspartate Amino Transf


(AST/SGOT) 19 U/L (15-37) 


  17 U/L (15-37) 


 


 


Alanine Aminotransferase


(ALT/SGPT) 17 U/L (14-59) 


  21 U/L (14-59) 


 


 


Alkaline Phosphatase


  110 U/L


() 119 U/L


()


 


Total Protein


  6.1 g/dL


(6.4-8.2) 6.6 g/dL


(6.4-8.2)


 


Albumin


  3.1 g/dL


(3.4-5.0) 3.3 g/dL


(3.4-5.0)


 


Albumin/Globulin Ratio 1.0 (1.0-1.7)  1.0 (1.0-1.7) 








Medications





Current Medications


Sodium Chloride 1,000 ml @  500 mls/hr 1X  ONCE IV ;  Start 8/25/17 at 10:45;  

Stop 8/25/17 at 12:44;  Status DC


Diltiazem HCl (Cardizem) 10 mg 1X  ONCE IVP  Last administered on 8/25/17at 11:

01;  Start 8/25/17 at 10:40;  Stop 8/25/17 at 10:41;  Status DC


Diltiazem HCl 125 mg/Dextrose 125 ml @ 0 mls/hr 1X  ONCE IV  Last administered 

on 8/25/17at 11:03;  Start 8/25/17 at 10:45;  Stop 8/25/17 at 10:46;  Status DC


Dextrose 100 ml @  As Directed STK-MED ONCE IV ;  Start 8/25/17 at 10:53;  Stop 

8/25/17 at 10:54;  Status DC


Diltiazem HCl (Cardizem) 125 mg STK-MED ONCE IV ;  Start 8/25/17 at 10:54;  

Stop 8/25/17 at 10:55;  Status DC


Amiodarone HCl 900 mg/Dextrose 518 ml @ 0 mls/hr 1X  ONCE IV  Last administered 

on 8/25/17at 12:05;  Start 8/25/17 at 11:45;  Stop 8/25/17 at 11:46;  Status DC


Amiodarone HCl 900 mg/Dextrose 518 ml @ 0 mls/hr 1X  ONCE IV ;  Start 8/25/17 

at 11:45;  Stop 8/25/17 at 11:45;  Status DC


Ondansetron HCl (Zofran) 4 mg PRN Q8HRS  PRN IV NAUSEA/VOMITING;  Start 8/25/17 

at 13:45


Furosemide (Lasix) 40 mg DAILY IVP  Last administered on 8/25/17at 15:56;  

Start 8/25/17 at 15:00;  Stop 8/26/17 at 08:45;  Status DC


Amiodarone HCl 900 mg/Dextrose 518 ml @ 0 mls/hr CONT  PRN IV SEE I/O RECORD 

Last administered on 8/25/17at 15:27;  Start 8/25/17 at 15:00;  Stop 8/25/17 at 

15:27;  Status DC


Albuterol/ Ipratropium (Duoneb) 3 ml RTQID NEB  Last administered on 8/29/17at 

09:14;  Start 8/25/17 at 16:00


Guaifenesin (MUCINEX ER with DM) 1 tab BID PO  Last administered on 8/29/17at 08

:35;  Start 8/25/17 at 16:20


Methylprednisolone Sodium Succinate (SOLU-Medrol 40MG VIAL) 40 mg Q12HR IV  

Last administered on 8/27/17at 09:15;  Start 8/25/17 at 16:20;  Stop 8/27/17 at 

09:55;  Status DC


Aspirin (Children'S Aspirin) 81 mg DAILY PO  Last administered on 8/29/17at 08:

36;  Start 8/26/17 at 09:00


Clopidogrel Bisulfate (Plavix) 75 mg DAILY PO  Last administered on 8/29/17at 08

:35;  Start 8/26/17 at 09:00


Simvastatin (Zocor) 5 mg HS PO  Last administered on 8/28/17at 20:37;  Start 8/ 25/17 at 21:00


Potassium Chloride (Klor-Con) 20 meq BID PO  Last administered on 8/29/17at 08:

36;  Start 8/25/17 at 21:00


Rivaroxaban (Xarelto) 20 mg DAILY PO  Last administered on 8/29/17at 08:35;  

Start 8/26/17 at 09:00


Levothyroxine Sodium (Synthroid) 25 mcg DAILY07 PO  Last administered on 8/26/ 17at 09:13;  Start 8/26/17 at 08:45;  Stop 8/26/17 at 10:09;  Status DC


Furosemide (Lasix) 40 mg DAILY PO  Last administered on 8/29/17at 08:36;  Start 

8/26/17 at 09:00


Digoxin (Lanoxin) 500 mcg 1X  ONCE IV  Last administered on 8/26/17at 09:14;  

Start 8/26/17 at 09:00;  Stop 8/26/17 at 09:01;  Status DC


Amiodarone HCl 450 mg/Dextrose 259 ml @  17.26 mls/ hr CONT  PRN IV SEE I/O 

RECORD Last administered on 8/26/17at 11:45;  Start 8/26/17 at 12:00;  Stop 8/26 /17 at 15:42;  Status DC


Dopamine HCl/ Dextrose 250 ml @  16.84 mls/ hr CONT  PRN IV SEE I/O RECORD Last 

administered on 8/26/17at 18:30;  Start 8/26/17 at 16:00


Methylprednisolone Sodium Succinate (SOLU-Medrol 40MG VIAL) 40 mg DAILY IV  

Last administered on 8/29/17at 08:35;  Start 8/28/17 at 09:00


Digoxin (Lanoxin) 250 mcg 1X  ONCE IV  Last administered on 8/27/17at 10:37;  

Start 8/27/17 at 10:30;  Stop 8/27/17 at 10:31;  Status DC


Amiodarone HCl (Cordarone) 200 mg DAILY PO  Last administered on 8/29/17at 08:36

;  Start 8/28/17 at 10:15





Active Scripts


Active


Reported


Plavix (Clopidogrel Bisulfate) 75 Mg Tablet 75 Mg PO DAILY


Xarelto (Rivaroxaban) 20 Mg Tablet 20 Mg PO DAILY


Aspirin 81 Mg Tab.chew 81 Mg PO DAILY


Lasix (Furosemide) 40 Mg Tablet 1 Tab PO DAILY


K-Tab ER (Potassium Chloride) 20 Meq Tablet.er 20 Meq PO BID


Simvastatin 5 Mg Tablet 5 Mg PO HS


Multi-Vitamin Daily (Multivitamin) 1 Each Tablet 1 Each PO


Vitals/I & O





Vital Sign - Last 24 Hours








 8/28/17 8/28/17 8/28/17 8/28/17





 16:08 16:46 19:35 19:35


 


Temp   97.6 


 


Pulse 109  97 


 


Resp 24  24 


 


B/P (MAP) 114/65 (81)  118/57 (77) 


 


Pulse Ox 92 98 96 


 


O2 Delivery Room Air Room Air Room Air Room Air


 


    





 8/28/17 8/28/17 8/29/17 8/29/17





 19:53 23:10 03:15 04:06


 


Temp  98.1 97.9 


 


Pulse  90 93 


 


Resp  22 24 


 


B/P (MAP)  118/69 (85) 116/58 (77) 


 


Pulse Ox 98 96 97 96


 


O2 Delivery Room Air Room Air Room Air Room Air


 


    





 8/29/17 8/29/17 8/29/17 8/29/17





 08:30 08:36 08:48 09:14


 


Temp   97.6 


 


Pulse  93 96 


 


Resp   24 


 


B/P (MAP)  116/58 117/62 (80) 


 


Pulse Ox   93 95


 


O2 Delivery Room Air  Room Air Room Air


 


    





 8/29/17   





 11:15   


 


Temp 97.4   


 


Pulse 84   


 


Resp 24   


 


B/P (MAP) 95/55 (68)   


 


Pulse Ox 95   


 


O2 Delivery Room Air   














Intake and Output   


 


 8/29/17 8/29/17 8/30/17





 15:00 23:00 07:00


 


Intake Total 680 ml  


 


Output Total 500 ml  


 


Balance 180 ml  

















ODETTE HANKS Aug 29, 2017 13:30

## 2017-08-29 NOTE — PN
DATE:  08/28/2017



SUBJECTIVE:  The patient is sitting, slightly propped up in her recliner, mildly

tachypneic.  She continued to have marked dyspnea on exertion, her heart rate

continued to go up 150.  She was on amiodarone drip and also Dopamine for

hypertension that has resolved.  She is now on amiodarone 200 mg once a day as

well as rivaroxaban, Plavix, and aspirin. She was seen by the physical and

occupational therapist and she is clearly very short of breath on exertion.



OBJECTIVE:

GENERAL:  When I examined her, she was slightly pale, but no jaundice, cyanosis,

or thyromegaly.  No jugular venous distention.  She has bilateral lower limb

edema.

VITAL SIGNS:  Her heart rate was 97, blood pressure 116/60, temperature was

97.6, respiratory rate 24 and oxygen saturation was 98% on room air.

HEAD, EYES, EARS, NOSE AND THROAT:  Showed normocephalic, atraumatic.

NECK:  Supple.

HEART:  Showed regular 1st heart sounds, normal 2nd heart sounds with no gallop,

rub or murmur.

CHEST:  Shows central trachea, equal bilateral expansion, air entry, vesicular

breath sounds.  No crepitation or rhonchi.

ABDOMEN:  Distended, soft, nontender.  No guarding or rigidity.  No

organomegaly.  Hernial orifices are intact.  Bowel sounds normal.

NEUROLOGIC:  She was awake, alert, responding appropriately.  Cranial nerves are

intact.  She moves extremities without difficulty.  She ambulates without

assistance or assistive devices, but she continued to have marked shortness of

breath and her heart rate continued to accelerate up to 150.



Her intake over the last 24 hours was 1730, output was 1450.



LABORATORY DATA:  As of this morning showed a serum sodium of 140, potassium

4.3, chloride 106, bicarbonate 29, anion gap of 5, BUN 27, creatinine 1.1,

estimated GFR was 47 mL per minute.  Her glucose was 83, calcium was 8.5,

magnesium was 2.  Total bilirubin, AST, ALT, alkaline phosphatase were normal. 

Total protein was 6.1, albumin 3.1.  Her white cell count was 12,400, hemoglobin

10.7, hematocrit 33.7, MCV 93, and platelet count 255,000 with normal manual

differential.  Her nasal screen for MRSA by PCR was negative.



ASSESSMENT:

1. Atrial fibrillation with rapid ventricular response.

2. Acute exacerbation of chronic obstructive pulmonary disease.

3. Acute on chronic diastolic congestive heart failure.

4. Chronic lymphedema.

5. Peripheral vascular arterial disease status post left subclavian artery

stenting, moderate mitral regurgitation, moderate tricuspid regurgitation,

moderate pulmonary hypertension ____.



PLAN:  To continue with amiodarone.  Continue with nebulized treatment as well

as steroids.  She will work with physical therapy again today and hopefully

tomorrow morning and if she is feeling better, she will be discharged home

tomorrow.





______________________________

LUCY DE SOUZA MD



DR:  VIVIENNE/selvin  JOB#:  [id number] / [id number]

DD:  08/28/2017 14:19  DT:  08/29/2017 01:29

## 2017-12-11 ENCOUNTER — HOSPITAL ENCOUNTER (INPATIENT)
Dept: HOSPITAL 63 - ER | Age: 82
LOS: 2 days | Discharge: HOME HEALTH | DRG: 314 | End: 2017-12-13
Attending: FAMILY MEDICINE | Admitting: FAMILY MEDICINE
Payer: COMMERCIAL

## 2017-12-11 VITALS — DIASTOLIC BLOOD PRESSURE: 65 MMHG | SYSTOLIC BLOOD PRESSURE: 99 MMHG

## 2017-12-11 VITALS — SYSTOLIC BLOOD PRESSURE: 122 MMHG | DIASTOLIC BLOOD PRESSURE: 64 MMHG

## 2017-12-11 VITALS — SYSTOLIC BLOOD PRESSURE: 100 MMHG | DIASTOLIC BLOOD PRESSURE: 61 MMHG

## 2017-12-11 VITALS — DIASTOLIC BLOOD PRESSURE: 64 MMHG | SYSTOLIC BLOOD PRESSURE: 125 MMHG

## 2017-12-11 VITALS — WEIGHT: 203.44 LBS | HEIGHT: 68 IN | BODY MASS INDEX: 30.83 KG/M2

## 2017-12-11 DIAGNOSIS — I50.43: ICD-10-CM

## 2017-12-11 DIAGNOSIS — J44.9: ICD-10-CM

## 2017-12-11 DIAGNOSIS — I08.1: ICD-10-CM

## 2017-12-11 DIAGNOSIS — Z79.01: ICD-10-CM

## 2017-12-11 DIAGNOSIS — I11.0: ICD-10-CM

## 2017-12-11 DIAGNOSIS — Z90.710: ICD-10-CM

## 2017-12-11 DIAGNOSIS — I27.29: Primary | ICD-10-CM

## 2017-12-11 DIAGNOSIS — I27.81: ICD-10-CM

## 2017-12-11 DIAGNOSIS — M19.90: ICD-10-CM

## 2017-12-11 DIAGNOSIS — Z90.49: ICD-10-CM

## 2017-12-11 DIAGNOSIS — I48.0: ICD-10-CM

## 2017-12-11 DIAGNOSIS — G47.30: ICD-10-CM

## 2017-12-11 DIAGNOSIS — I73.9: ICD-10-CM

## 2017-12-11 DIAGNOSIS — E78.5: ICD-10-CM

## 2017-12-11 LAB
ALBUMIN SERPL-MCNC: 3.7 G/DL (ref 3.4–5)
ALBUMIN/GLOB SERPL: 1.2 {RATIO} (ref 1–1.7)
ALP SERPL-CCNC: 109 U/L (ref 46–116)
ALT SERPL-CCNC: 20 U/L (ref 14–59)
AMORPH SED URNS QL MICRO: PRESENT /HPF
ANION GAP SERPL CALC-SCNC: 9 MMOL/L (ref 6–14)
APTT PPP: YELLOW S
AST SERPL-CCNC: 18 U/L (ref 15–37)
BACTERIA #/AREA URNS HPF: (no result) /HPF
BASOPHILS # BLD AUTO: 0 X10^3/UL (ref 0–0.2)
BASOPHILS NFR BLD: 1 % (ref 0–3)
BILIRUB SERPL-MCNC: 0.5 MG/DL (ref 0.2–1)
BILIRUB UR QL STRIP: (no result)
BUN/CREAT SERPL: 15 (ref 6–20)
CA-I SERPL ISE-MCNC: 15 MG/DL (ref 7–20)
CALCIUM SERPL-MCNC: 9.1 MG/DL (ref 8.5–10.1)
CHLORIDE SERPL-SCNC: 106 MMOL/L (ref 98–107)
CK SERPL-CCNC: 77 U/L (ref 26–192)
CO2 SERPL-SCNC: 29 MMOL/L (ref 21–32)
CREAT SERPL-MCNC: 1 MG/DL (ref 0.6–1)
DIG: 1.1 NG/DL (ref 0.9–2)
EOSINOPHIL NFR BLD: 0.1 X10^3/UL (ref 0–0.7)
EOSINOPHIL NFR BLD: 2 % (ref 0–3)
ERYTHROCYTE [DISTWIDTH] IN BLOOD BY AUTOMATED COUNT: 15.8 % (ref 11.5–14.5)
FIBRINOGEN PPP-MCNC: (no result) MG/DL
GFR SERPLBLD BASED ON 1.73 SQ M-ARVRAT: 53 ML/MIN
GLOBULIN SER-MCNC: 3.1 G/DL (ref 2.2–3.8)
GLUCOSE SERPL-MCNC: 82 MG/DL (ref 70–99)
GLUCOSE UR STRIP-MCNC: (no result) MG/DL
HCT VFR BLD CALC: 36.1 % (ref 36–47)
HGB BLD-MCNC: 11.8 G/DL (ref 12–15.5)
HYALINE CASTS #/AREA URNS LPF: (no result) /HPF
LYMPHOCYTES # BLD: 1 X10^3/UL (ref 1–4.8)
LYMPHOCYTES NFR BLD AUTO: 17 % (ref 24–48)
MCH RBC QN AUTO: 30 PG (ref 25–35)
MCHC RBC AUTO-ENTMCNC: 33 G/DL (ref 31–37)
MCV RBC AUTO: 92 FL (ref 79–100)
MONO #: 0.8 X10^3/UL (ref 0–1.1)
MONOCYTES NFR BLD: 13 % (ref 0–9)
NEUT #: 4.1 X10^3UL (ref 1.8–7.7)
NEUTROPHILS NFR BLD AUTO: 67 % (ref 31–73)
NITRITE UR QL STRIP: (no result)
PLATELET # BLD AUTO: 259 X10^3/UL (ref 140–400)
POTASSIUM SERPL-SCNC: 4.4 MMOL/L (ref 3.5–5.1)
PROT SERPL-MCNC: 6.8 G/DL (ref 6.4–8.2)
RBC # BLD AUTO: 3.91 X10^6/UL (ref 3.5–5.4)
RBC #/AREA URNS HPF: (no result) /HPF (ref 0–2)
SODIUM SERPL-SCNC: 144 MMOL/L (ref 136–145)
SP GR UR STRIP: 1.01
SQUAMOUS #/AREA URNS LPF: (no result) /LPF
UROBILINOGEN UR-MCNC: 0.2 MG/DL
WBC # BLD AUTO: 6.1 X10^3/UL (ref 4–11)
WBC #/AREA URNS HPF: (no result) /HPF (ref 0–4)

## 2017-12-11 PROCEDURE — 85025 COMPLETE CBC W/AUTO DIFF WBC: CPT

## 2017-12-11 PROCEDURE — 87086 URINE CULTURE/COLONY COUNT: CPT

## 2017-12-11 PROCEDURE — 85027 COMPLETE CBC AUTOMATED: CPT

## 2017-12-11 PROCEDURE — 83735 ASSAY OF MAGNESIUM: CPT

## 2017-12-11 PROCEDURE — 82553 CREATINE MB FRACTION: CPT

## 2017-12-11 PROCEDURE — 83880 ASSAY OF NATRIURETIC PEPTIDE: CPT

## 2017-12-11 PROCEDURE — 84484 ASSAY OF TROPONIN QUANT: CPT

## 2017-12-11 PROCEDURE — 83605 ASSAY OF LACTIC ACID: CPT

## 2017-12-11 PROCEDURE — 80162 ASSAY OF DIGOXIN TOTAL: CPT

## 2017-12-11 PROCEDURE — 71020: CPT

## 2017-12-11 PROCEDURE — 36415 COLL VENOUS BLD VENIPUNCTURE: CPT

## 2017-12-11 PROCEDURE — 93306 TTE W/DOPPLER COMPLETE: CPT

## 2017-12-11 PROCEDURE — 94620: CPT

## 2017-12-11 PROCEDURE — 94760 N-INVAS EAR/PLS OXIMETRY 1: CPT

## 2017-12-11 PROCEDURE — 96374 THER/PROPH/DIAG INJ IV PUSH: CPT

## 2017-12-11 PROCEDURE — 94640 AIRWAY INHALATION TREATMENT: CPT

## 2017-12-11 PROCEDURE — 85730 THROMBOPLASTIN TIME PARTIAL: CPT

## 2017-12-11 PROCEDURE — 81001 URINALYSIS AUTO W/SCOPE: CPT

## 2017-12-11 PROCEDURE — 80053 COMPREHEN METABOLIC PANEL: CPT

## 2017-12-11 PROCEDURE — 85610 PROTHROMBIN TIME: CPT

## 2017-12-11 PROCEDURE — 93005 ELECTROCARDIOGRAM TRACING: CPT

## 2017-12-11 RX ADMIN — IPRATROPIUM BROMIDE AND ALBUTEROL SULFATE SCH ML: .5; 3 SOLUTION RESPIRATORY (INHALATION) at 21:40

## 2017-12-11 RX ADMIN — POTASSIUM CHLORIDE SCH MEQ: 1500 TABLET, EXTENDED RELEASE ORAL at 20:16

## 2017-12-11 RX ADMIN — SIMVASTATIN SCH MG: 10 TABLET, FILM COATED ORAL at 20:17

## 2017-12-11 RX ADMIN — FUROSEMIDE SCH MG: 10 INJECTION, SOLUTION INTRAMUSCULAR; INTRAVENOUS at 20:17

## 2017-12-11 NOTE — PHYS DOC
Past History


Past Medical History:  A-Fib, CHF, COPD, High Cholesterol, Other


Past Surgical History:  Hysterectomy, Tonsillectomy, Other


Alcohol Use:  None


Drug Use:  None





Adult General


Chief Complaint


Chief Complaint:  SHORTNESS OF BREATH





[facility]


HPI


83-year-old male patient sent from her primary care physician office because of 

shortness of breath on CHF exacerbation. Patient has history of atrial 

fibrillation and CHF on Lasix 40 mg daily and was seen at her doctor's office 

today because of increasing exertional shortness of breath for the last 10 days 

and dry coughs. Patient developed chest pain in left side of her chest as an 

aching pain with radiation to her back at arrival to her doctor office that 

resolve after a few minutes . Patient stated the pain was 5 or 6/10 and denied 

increasing shortness of redness, nausea, dizziness, focal neuro deficit during 

episodes of chest pain. Patient had increasing lower extremity edema for the 

last couple weeks. Patient complaining of chest without fever. Patient again 14 

pounds according to her primary care physician reports and had EKG that showed 

a fibrillation with RVR and sent to ER for evaluation and admission for CHF 

exacerbation.





Review of Systems


Review of Systems





Constitutional: Denies fever, reports weakness and chills []


Eyes: Denies change in visual acuity, redness, or eye pain []


HENT: Denies nasal congestion or sore throat []


Respiratory: Reports cough or shortness of breath []


Cardiovascular: No additional information not addressed in HPI []


GI: Denies abdominal pain, nausea, vomiting, bloody stools or diarrhea []


: Denies dysuria or hematuria []


Musculoskeletal: Denies back pain or joint pain []


Integument: Denies rash or skin lesions []


Neurologic: Denies headache, focal weakness or sensory changes []


Endocrine: Denies polyuria or polydipsia []





All other systems were reviewed and found to be within normal limits, except as 

documented in this note.





Allergies


Allergies





Allergies








Coded Allergies Type Severity Reaction Last Updated Verified


 


  Sulfa (Sulfonamide Antibiotics) Allergy Intermediate  12/11/17 Yes











Physical Exam


Physical Exam





Constitutional: Well nourished, mild distress, non-toxic appearance. []


HENT: Normocephalic, atraumatic, bilateral external ears normal, oropharynx 

moist, no oral exudates, nose normal. []


Eyes: PERRLA, EOMI, conjunctiva normal, no discharge. [] 


Neck: Normal range of motion, no tenderness, supple, no stridor. [] 


Cardiovascular:Heart rate regular rhythm, no murmur []


Lungs & Thorax: No respiratory distress, mild basilar rhonchi


Abdomen: Bowel sounds normal, soft, no tenderness, no masses, no pulsatile 

masses. [] 


Skin: Warm, dry, no erythema, no rash. [] 


Back: No tenderness, no CVA tenderness. [] 


Extremities: No tenderness, no cyanosis, no clubbing, ROM intact, 2 plus lower 

extremity edema. [] 


Neurologic: Alert and oriented X 3, normal motor function, normal sensory 

function, no focal deficits noted. []


Psychologic: Affect normal, judgement normal, mood normal. []





Current Patient Data


Vital Signs





 Vital Signs








  Date Time  Temp Pulse Resp B/P (MAP) Pulse Ox O2 Delivery O2 Flow Rate FiO2


 


12/11/17 11:30 97.8 59 20  97 Room Air  











EKG


EKG


[KJ interpreted by me. EKG at 1129 showed irregular rhythm with atrial 

fibrillation, ST and T-wave abnormality without elevation, Q waves in inferior 

leads]





Radiology/Procedures


Radiology/Procedures


Chest x-ray showed mild cardiomegaly without infiltration or pulmonary edema[]





Course & Med Decision Making


Course & Med Decision Making


Pertinent Labs and Imaging studies reviewed. (See chart for details)


Evaluation of patient in ER showed 82-year-old male patient sent from her 

primary care physician office valuation of CHF exacerbation and instructed 

patient chest pain. Patient did not have chest pain at arrival to ER and had 

stable vital signs O2 sat of 98% at room air. Patient had mild exertional 

shortness of breath with lower extremity edema. Labs showed mild elevation of 

BNP with normal cardiac enzymes and electrolytes and renal function tests. 

Patient currently taking Lasix daily and another dose of Lasix 20 mg IV was 

given in ER and patient felt better. EKG showed atrial flutter patient without 

RVR. Dr. Thornton consulted at 1236 and at accepted admission to telemetry bed as 

a full admission.





Dragon Disclaimer


Dragon Disclaimer


This electronic medical record was generated, in whole or in part, using a 

voice recognition dictation system.





Departure


Departure:


Impression:  


 Primary Impression:  


 CHF exacerbation


 Additional Impressions:  


 Atrial fibrillation


 Dyspnea


 Pedal edema


 Chest pain


 Cardiomegaly


Disposition:  09 ADMITTED AS INPATIENT (At 1238)


Admitting Physician:  Tiffanie Thornton


Condition:  IMPROVED


Referrals:  


LUCI PANDYA MD (PCP)


Critical Care Time


 Critical care time was [70] minutes exclusive of procedures.





Problem Qualifiers











NIKKIE SUAREZ MD Dec 11, 2017 11:53

## 2017-12-11 NOTE — RAD
Chest, 2 views, 12/11/2017:



History: Shortness of breath



Comparison is made to a study from 8/25/2017. The heart is mildly enlarged.

There is tortuosity and calcific plaquing of the thoracic aorta. The pulmonary

vascularity is within normal limits. There is minimal basilar scarring. No

acute infiltrates are seen. No significant pleural fluid is evident. Moderate

scattered spurs are present in the spine.



IMPRESSION:

1. Mild cardiomegaly and aortic atherosclerosis.

2. No acute abnormality is detected.

## 2017-12-11 NOTE — CONS
DATE OF CONSULTATION:  12/11/2017



REASON FOR CONSULTATION:  Dyspnea.



HISTORY OF PRESENT ILLNESS:  The patient is a pleasant 83-year-old woman who is

well known to our office and was recently seen for blood pressure issues,

presents to the hospital in the setting of progressive dyspnea.



She reports that she would not be in the hospital unless she was prompted by her

doctor.  According to her daughter, she has been progressive having functional

issues with respect to dyspnea over the course of the last 3-4 months.  She has

had exertional dyspnea to the point where walking across her house makes her

have some dyspnea.  She denies any associated chest pain.  She has a history of

chronic diastolic heart failure as noted below.  She otherwise reports

compliance with her medications, but in the recent past her dietary compliance

has been lacking.



PAST MEDICAL HISTORY:

1. Left subclavian stenosis status post stent placement.

2. Chronic obstructive pulmonary disease.

3. Dyslipidemia.

4. Diastolic heart failure.

5. Atrial fibrillation, on anticoagulation.



SOCIAL HISTORY:  The patient denies any alcohol, tobacco or illicit drug use.



FAMILY HISTORY:  Noncontributory.



REVIEW OF SYSTEMS:  Negative for 10 out of 14 systems reviewed, unless otherwise

mentioned above in the HPI.



CARDIOVASCULAR MEDICATIONS:  Include

1. Metoprolol 25 mg b.i.d.

2. Lasix 40 mg daily.

3. Simvastatin 5 mg daily.

4. Aspirin 81 mg daily.

5. Xarelto 20 mg daily.

6. Flecainide 100 mg b.i.d.

7. Plavix 75 mg daily.



PHYSICAL EXAMINATION:

VITAL SIGNS:  Afebrile, pulse 73, respirations 24, blood pressure 122/64, pulse

ox 94% on room air.

GENERAL:  She was alert and oriented, in no acute distress.

HEAD AND NECK:  Unremarkable.

HEART:  Regular rate and rhythm without any murmurs, rubs or gallops.

LUNGS:  Clear to auscultation bilaterally.

ABDOMEN:  Soft, nontender, nondistended, without any rebound tenderness.

EXTREMITIES:  There is bilateral 3+ pitting edema of the lower extremities

extending all the way up to the knee.

MUSCULOSKELETAL:  No trauma.



DIAGNOSTIC STUDIES:

1. EKG demonstrates atrial fibrillation without any acute ST or T-wave changes.

2. Hemoglobin 11.8, platelets 259, creatinine 1.0, troponin negative x 1, BNP of

743.

3. Chest x-ray demonstrates no acute pulmonary edema.

4. Echocardiogram demonstrates EF of 45% to 50% with mildly dilated left

ventricle and moderate-to-severe pulmonary hypertension.



IMPRESSION:

1. Progressive dyspnea and weight gain, likely in the setting of diastolic and

systolic heart failure.

2. Cor pulmonale based on physical exam findings of significant lower extremity

edema without any significantly elevated BNP levels.

3. Paroxysmal atrial fibrillation, currently in rate controlled atrial

fibrillation.

4. Hypertension.  Currently, well controlled.

5. Dyslipidemia.



RECOMMENDATIONS:  We will initiate the patient on low dose diuretic and monitor

her urine output and weight loss.  Given her BNP elevation is very minimal, I do

not suspect that she has significant intravascular volume overload rather that

her lower extremity edema is likely a sequela of her right-sided heart failure.



Supportive care for now.  Continue home medications.



Thank you for this consultation.





______________________________

TEVIN SANTIAGO MD



DR:  JOAQUINA/selvin  JOB#:  [id number] / [id number]

DD:  12/11/2017 17:24  DT:  12/11/2017 18:10

## 2017-12-11 NOTE — CARD
--------------- APPROVED REPORT --------------





EXAM: Two-dimensional and M-mode echocardiogram with Doppler and color Doppler.



Other Information 

Quality : Good



INDICATION

Peripheral Edema 



2D DIMENSIONS 

Left Atrium(2D)4.3 (1.6-4.0cm)IVSd1.1 (0.7-1.1cm)

Aortic Root(2D)3.2 (2.0-3.7cm)LVDd5.5 (3.9-5.9cm)

LVOT Diameter2.1 (1.8-2.4cm)PWd1.1 (0.7-1.1cm)

LVDs4.1 (2.5-4.0cm)FS (%) 24.2 %

SV69.0 mlLVEF(%)47.6 (>50%)



Aortic Valve

AoV Peak Chris.144.6cm/sAoV VTI24.7cm

AO Peak GR.8.4mmHgLVOT Peak Chris.93.1cm/s

LVOT  VTI 20.39cmAO Mean GR.4mmHg

SYD (VMAX)2.28dc0SCT   (VTI)2.98cm2



Mitral Valve

MV E Gsacfbqy649.5cm/sMV DECEL [id number]

MV A Qplbbnwx50.4cm/sE/A  Ratio2.9



Tricuspid Valve

TR P. Bbrxpyhj886mw/sRAP [id number]

TR Peak Gr.83hfQfETVN82jpNw



 LEFT VENTRICLE 

The left ventricle is normal size. There is normal left ventricular wall thickness. The Ejection Frac
tion is 45-50%. Mild LV dysfunction. There is normal LV segmental wall motion. Septal motion consiste
nt with conduction abnormality.



 RIGHT VENTRICLE 

The right ventricle is mildly dilated. The right ventricular systolic function is normal.



 ATRIA 

The left atrium is mildly dilated. The right atrium is moderately dilated The interatrial septum is i
ntact with no evidence for an atrial septal defect or patent foramen ovale as noted on 2-D or Doppler
 imaging.



 AORTIC VALVE 

Not well visualized. Doppler and Color Flow revealed trace aortic regurgitation. There is no signific
ant aortic valvular stenosis.



 MITRAL VALVE 

The mitral valve is calcified but opens well. There is no evidence of mitral valve prolapse. There is
 no mitral valve stenosis. Doppler and Color-flow revealed mild to moderate mitral regurgitation.



 TRICUSPID VALVE 

The tricuspid valve is normal in structure and function. Doppler and Color Flow revealed mild to mode
rate tricuspid regurgitation. There is moderate-severe pulmonary hypertension. The PA pressure was es
timated at 67 mmHg. There is no tricuspid valve stenosis.



 PULMONIC VALVE 

Doppler and Color Flow revealed trace pulmonic valvular regurgitation. There is no pulmonic valvular 
stenosis.



 GREAT VESSELS 

The aortic root is normal in size. The ascending aorta is normal in size. The IVC is dilated.



 PERICARDIAL EFFUSION 

There is no evidence of significant pericardial effusion.



Critical Notification

Critical Value: No



<Conclusion>

There is normal LV segmental wall motion. Septal motion consistent with conduction abnormality.

The Ejection Fraction is 45-50%. Mild LV dysfunction.

The right ventricle is mildly dilated.

Doppler and Color-flow revealed mild to moderate mitral regurgitation.

Doppler and Color Flow revealed mild to moderate tricuspid regurgitation. There is moderate-severe pu
lmonary hypertension. The PA pressure was estimated at 67 mmHg.

## 2017-12-12 VITALS — SYSTOLIC BLOOD PRESSURE: 103 MMHG | DIASTOLIC BLOOD PRESSURE: 55 MMHG

## 2017-12-12 VITALS — DIASTOLIC BLOOD PRESSURE: 71 MMHG | SYSTOLIC BLOOD PRESSURE: 129 MMHG

## 2017-12-12 VITALS — DIASTOLIC BLOOD PRESSURE: 62 MMHG | SYSTOLIC BLOOD PRESSURE: 102 MMHG

## 2017-12-12 VITALS — DIASTOLIC BLOOD PRESSURE: 80 MMHG | SYSTOLIC BLOOD PRESSURE: 121 MMHG

## 2017-12-12 VITALS — SYSTOLIC BLOOD PRESSURE: 116 MMHG | DIASTOLIC BLOOD PRESSURE: 61 MMHG

## 2017-12-12 LAB
ALBUMIN SERPL-MCNC: 3.2 G/DL (ref 3.4–5)
ALBUMIN/GLOB SERPL: 1 {RATIO} (ref 1–1.7)
ALP SERPL-CCNC: 91 U/L (ref 46–116)
ALT SERPL-CCNC: 18 U/L (ref 14–59)
ANION GAP SERPL CALC-SCNC: 8 MMOL/L (ref 6–14)
AST SERPL-CCNC: 17 U/L (ref 15–37)
BILIRUB SERPL-MCNC: 0.6 MG/DL (ref 0.2–1)
BUN/CREAT SERPL: 13 (ref 6–20)
CA-I SERPL ISE-MCNC: 15 MG/DL (ref 7–20)
CALCIUM SERPL-MCNC: 8.7 MG/DL (ref 8.5–10.1)
CHLORIDE SERPL-SCNC: 105 MMOL/L (ref 98–107)
CO2 SERPL-SCNC: 31 MMOL/L (ref 21–32)
CREAT SERPL-MCNC: 1.2 MG/DL (ref 0.6–1)
ERYTHROCYTE [DISTWIDTH] IN BLOOD BY AUTOMATED COUNT: 15.4 % (ref 11.5–14.5)
GFR SERPLBLD BASED ON 1.73 SQ M-ARVRAT: 42.9 ML/MIN
GLOBULIN SER-MCNC: 3.3 G/DL (ref 2.2–3.8)
GLUCOSE SERPL-MCNC: 96 MG/DL (ref 70–99)
HCT VFR BLD CALC: 33.6 % (ref 36–47)
HGB BLD-MCNC: 11.2 G/DL (ref 12–15.5)
MAGNESIUM SERPL-MCNC: 1.9 MG/DL (ref 1.8–2.4)
MCH RBC QN AUTO: 31 PG (ref 25–35)
MCHC RBC AUTO-ENTMCNC: 33 G/DL (ref 31–37)
MCV RBC AUTO: 92 FL (ref 79–100)
PLATELET # BLD AUTO: 254 X10^3/UL (ref 140–400)
POTASSIUM SERPL-SCNC: 3.5 MMOL/L (ref 3.5–5.1)
PROT SERPL-MCNC: 6.5 G/DL (ref 6.4–8.2)
RBC # BLD AUTO: 3.67 X10^6/UL (ref 3.5–5.4)
SODIUM SERPL-SCNC: 144 MMOL/L (ref 136–145)
WBC # BLD AUTO: 5.3 X10^3/UL (ref 4–11)

## 2017-12-12 RX ADMIN — IPRATROPIUM BROMIDE AND ALBUTEROL SULFATE SCH ML: .5; 3 SOLUTION RESPIRATORY (INHALATION) at 15:33

## 2017-12-12 RX ADMIN — IPRATROPIUM BROMIDE AND ALBUTEROL SULFATE SCH ML: .5; 3 SOLUTION RESPIRATORY (INHALATION) at 21:07

## 2017-12-12 RX ADMIN — IPRATROPIUM BROMIDE AND ALBUTEROL SULFATE SCH ML: .5; 3 SOLUTION RESPIRATORY (INHALATION) at 09:57

## 2017-12-12 RX ADMIN — CLOPIDOGREL BISULFATE SCH MG: 75 TABLET ORAL at 09:31

## 2017-12-12 RX ADMIN — RIVAROXABAN SCH MG: 10 TABLET, FILM COATED ORAL at 09:31

## 2017-12-12 RX ADMIN — POTASSIUM CHLORIDE SCH MEQ: 1500 TABLET, EXTENDED RELEASE ORAL at 20:42

## 2017-12-12 RX ADMIN — FUROSEMIDE SCH MG: 10 INJECTION, SOLUTION INTRAMUSCULAR; INTRAVENOUS at 09:30

## 2017-12-12 RX ADMIN — IPRATROPIUM BROMIDE AND ALBUTEROL SULFATE SCH ML: .5; 3 SOLUTION RESPIRATORY (INHALATION) at 12:39

## 2017-12-12 RX ADMIN — I-VITE, TAB 1000-60-2MG (60/BT) SCH TAB: TAB at 09:31

## 2017-12-12 RX ADMIN — SIMVASTATIN SCH MG: 10 TABLET, FILM COATED ORAL at 20:42

## 2017-12-12 RX ADMIN — AMIODARONE HYDROCHLORIDE SCH MG: 200 TABLET ORAL at 09:31

## 2017-12-12 RX ADMIN — POTASSIUM CHLORIDE SCH MEQ: 1500 TABLET, EXTENDED RELEASE ORAL at 09:31

## 2017-12-12 NOTE — PDOC
PROGRESS NOTES


Diagnosis


Problem


Problems


Medical Problems:


(1) Atrial fibrillation


Status: Acute  





(2) CHF exacerbation


Status: Acute  





(3) Dyspnea


Status: Acute  





(4) Pedal edema


Status: Acute  








Assessment


Problems


Medical Problems:


(1) Atrial fibrillation


Status: Acute  





(2) CHF exacerbation


Status: Acute  





(3) Dyspnea


Status: Acute  





(4) Pedal edema


Status: Acute  








1. Cor pulmonale - CXR without congestion.  Good diuresis and maintaining SaO2 

on room air.  PT reports still significant dyspnea with exertion.  Change lasix 

to PO in am. 


2. Paroxysmal atrial fibrillation, - she remains in atrial fibrillation with 

CVR.  On amiodarone and cardizem.  Xarelto for stroke prophylaxis. 


3. Hypertension.- Controlled on current medical therapy.


4. Dyslipidemia. - continue statin





Problems:  


Subjective


"feeling better", edema improving.  still dyspneic with exertion.


Objective


tele - atrial fibrillation with controlled ventricular response


Vital Signs








  Date Time  Temp Pulse Resp B/P (MAP) Pulse Ox O2 Delivery O2 Flow Rate FiO2


 


12/12/17 09:57     97 Room Air  


 


12/12/17 09:31  83  129/71    


 


12/12/17 06:23 98.3  20     


 


12/12/17 05:51       2.0 














Intake and Output 


 


 12/12/17





 07:00


 


Intake Total 440 ml


 


Output Total 2400 ml


 


Balance -1960 ml


 


 


 


Intake Oral 440 ml


 


Output Urine Total 2400 ml








Abdomen:  Normal bowel sounds, Soft, No tenderness


Heart:  Other (irregular rate and rhythm, no gallops)


Extremities:  Other (2+ edema bilateral lower extremities)


General:  Alert, Oriented X3, Cooperative, No acute distress


Lungs:  Clear to auscultation


Neuro:  Normal speech


Psych/Mental Status:  Mental status NL, Mood NL


Review of Relevant


I have reviewed the following items kandice (where applicable) has been applied.


Labs





Laboratory Tests








Test


  12/11/17


11:40 12/11/17


11:54 12/11/17


11:58 12/12/17


06:10


 


White Blood Count


  6.1 x10^3/uL


(4.0-11.0) 


  


  5.3 x10^3/uL


(4.0-11.0)


 


Red Blood Count


  3.91 x10^6/uL


(3.50-5.40) 


  


  3.67 x10^6/uL


(3.50-5.40)


 


Hemoglobin


  11.8 g/dL


(12.0-15.5) 


  


  11.2 g/dL


(12.0-15.5)


 


Hematocrit


  36.1 %


(36.0-47.0) 


  


  33.6 %


(36.0-47.0)


 


Mean Corpuscular Volume 92 fL ()    92 fL () 


 


Mean Corpuscular Hemoglobin 30 pg (25-35)    31 pg (25-35) 


 


Mean Corpuscular Hemoglobin


Concent 33 g/dL


(31-37) 


  


  33 g/dL


(31-37)


 


Red Cell Distribution Width


  15.8 %


(11.5-14.5) 


  


  15.4 %


(11.5-14.5)


 


Platelet Count


  259 x10^3/uL


(140-400) 


  


  254 x10^3/uL


(140-400)


 


Neutrophils (%) (Auto) 67 % (31-73)    


 


Lymphocytes (%) (Auto) 17 % (24-48)    


 


Monocytes (%) (Auto) 13 % (0-9)    


 


Eosinophils (%) (Auto) 2 % (0-3)    


 


Basophils (%) (Auto) 1 % (0-3)    


 


Neutrophils # (Auto)


  4.1 x10^3uL


(1.8-7.7) 


  


  


 


 


Lymphocytes # (Auto)


  1.0 x10^3/uL


(1.0-4.8) 


  


  


 


 


Monocytes # (Auto)


  0.8 x10^3/uL


(0.0-1.1) 


  


  


 


 


Eosinophils # (Auto)


  0.1 x10^3/uL


(0.0-0.7) 


  


  


 


 


Basophils # (Auto)


  0.0 x10^3/uL


(0.0-0.2) 


  


  


 


 


Sodium Level


  144 mmol/L


(136-145) 


  


  144 mmol/L


(136-145)


 


Potassium Level


  4.4 mmol/L


(3.5-5.1) 


  


  3.5 mmol/L


(3.5-5.1)


 


Chloride Level


  106 mmol/L


() 


  


  105 mmol/L


()


 


Carbon Dioxide Level


  29 mmol/L


(21-32) 


  


  31 mmol/L


(21-32)


 


Anion Gap 9 (6-14)    8 (6-14) 


 


Blood Urea Nitrogen


  15 mg/dL


(7-20) 


  


  15 mg/dL


(7-20)


 


Creatinine


  1.0 mg/dL


(0.6-1.0) 


  


  1.2 mg/dL


(0.6-1.0)


 


Estimated GFR


(Cockcroft-Gault) 53.0 


  


  


  42.9 


 


 


BUN/Creatinine Ratio 15 (6-20)    13 (6-20) 


 


Glucose Level


  82 mg/dL


(70-99) 


  


  96 mg/dL


(70-99)


 


Lactic Acid Level


  1.2 mmol/L


(0.4-2.0) 


  


  


 


 


Calcium Level


  9.1 mg/dL


(8.5-10.1) 


  


  8.7 mg/dL


(8.5-10.1)


 


Total Bilirubin


  0.5 mg/dL


(0.2-1.0) 


  


  0.6 mg/dL


(0.2-1.0)


 


Aspartate Amino Transf


(AST/SGOT) 18 U/L (15-37) 


  


  


  17 U/L (15-37) 


 


 


Alanine Aminotransferase


(ALT/SGPT) 20 U/L (14-59) 


  


  


  18 U/L (14-59) 


 


 


Alkaline Phosphatase


  109 U/L


() 


  


  91 U/L


()


 


Creatine Kinase


  77 U/L


() 


  


  


 


 


Creatine Kinase MB (Mass)


  1.1 ng/mL


(0.0-3.6) 


  


  


 


 


Creatine Kinase MB Relative


Index 1.4 % (0-4) 


  


  


  


 


 


Troponin I Quantitative


  < 0.017 ng/mL


(0-0.055) 


  


  


 


 


NT-Pro-B-Type Natriuretic


Peptide 743 pg/mL


(0-449) 


  


  


 


 


Total Protein


  6.8 g/dL


(6.4-8.2) 


  


  6.5 g/dL


(6.4-8.2)


 


Albumin


  3.7 g/dL


(3.4-5.0) 


  


  3.2 g/dL


(3.4-5.0)


 


Albumin/Globulin Ratio 1.2 (1.0-1.7)    1.0 (1.0-1.7) 


 


Digoxin Level


  1.1 ng/dL


(0.9-2.0) 


  


  


 


 


Digoxin Last Dose Date 12/11/17    


 


Digoxin Last Dose Time 0800    


 


Urine Collection Type  Unknown   


 


Urine Color  Yellow   


 


Urine Clarity  Cloudy   


 


Urine pH  5.5   


 


Urine Specific Gravity  1.010   


 


Urine Protein


  


  Neg


(NEG-TRACE) 


  


 


 


Urine Glucose (UA)


  


  Neg mg/dL


(NEG) 


  


 


 


Urine Ketones (Stick)


  


  Neg mg/dL


(NEG) 


  


 


 


Urine Blood  Small (NEG)   


 


Urine Nitrite  Neg (NEG)   


 


Urine Bilirubin  Neg (NEG)   


 


Urine Urobilinogen Dipstick


  


  0.2 mg/dL (0.2


mg/dL) 


  


 


 


Urine Leukocyte Esterase  Large (NEG)   


 


Urine RBC  Occ /HPF (0-2)   


 


Urine WBC  1-4 /HPF (0-4)   


 


Urine Squamous Epithelial


Cells 


  Few /LPF 


  


  


 


 


Urine Amorphous Sediment  Present /HPF   


 


Urine Bacteria


  


  Few /HPF


(0-FEW) 


  


 


 


Urine Hyaline Casts  Few /HPF   


 


Prothrombin Time


  


  


  17.5 SEC


(9.4-11.4) 


 


 


Prothromb Time International


Ratio 


  


  1.4 (0.9-1.1) 


  


 


 


Activated Partial


Thromboplast Time 


  


  42 SEC (23-33) 


  


 


 


Magnesium Level


  


  


  


  1.9 mg/dL


(1.8-2.4)








Medications





Current Medications


Furosemide (Lasix) 20 mg 1X  ONCE IVP  Last administered on 12/11/17at 12:16;  

Start 12/11/17 at 12:15;  Stop 12/11/17 at 12:16;  Status DC


Furosemide (Lasix) 40 mg DAILY IVP  Last administered on 12/12/17at 09:30;  

Start 12/11/17 at 17:30


Albuterol/ Ipratropium (Duoneb) 3 ml STK-MED ONCE .ROUTE  Last administered on 

12/11/17at 18:02;  Start 12/11/17 at 17:59;  Stop 12/11/17 at 18:00;  Status DC


Albuterol/ Ipratropium (Duoneb) 3 ml RTQID NEB  Last administered on 12/12/17at 

09:57;  Start 12/11/17 at 20:00


Amiodarone HCl (Cordarone) 200 mg DAILY PO  Last administered on 12/12/17at 09:

31;  Start 12/12/17 at 09:00


Clopidogrel Bisulfate (Plavix) 75 mg DAILY PO  Last administered on 12/12/17at 

09:31;  Start 12/12/17 at 09:00


Diltiazem HCl (Cardizem 24hr Cd) 120 mg DAILY PO  Last administered on 12/12/ 17at 09:30;  Start 12/12/17 at 09:00


Guaifenesin (Mucinex Er) 600 mg BID PO  Last administered on 12/12/17at 09:31;  

Start 12/11/17 at 21:00


Albuterol/ Ipratropium (Duoneb) 3 ml RTQID NEB ;  Start 12/11/17 at 20:15;  

Status Cancel


Multivitamins/ Minerals (I-Xenia) 1 tab DAILY PO  Last administered on 12/12/ 17at 09:31;  Start 12/12/17 at 09:00


Potassium Chloride (Klor-Con) 20 meq BID PO  Last administered on 12/12/17at 09:

31;  Start 12/11/17 at 21:00


Rivaroxaban (Xarelto) 20 mg DAILY PO  Last administered on 12/12/17at 09:31;  

Start 12/12/17 at 09:00


Simvastatin (Zocor) 5 mg QHS PO  Last administered on 12/11/17at 20:17;  Start 

12/11/17 at 21:00





Active Scripts


Active


Mucinex (Guaifenesin) 600 Mg Tablet.er 1 Tab PO BID


Duoneb 0.5-3(2.5) Mg/3 Ml (Albuterol/Ipratropium) 3 Ml Ampul.neb 3 Ml NEB QID 

30 Days


Amiodarone Hcl 200 Mg Tablet 1 Tab PO DAILY


Reported


Ocuvite Tablet (Vit A,C & E/Lutein/Minerals) 1 Each Tablet 1 Each PO DAILY


Cartia Xt (Diltiazem Hcl) 120 Mg Cap.er.24h 120 Mg PO DAILY


Plavix (Clopidogrel Bisulfate) 75 Mg Tablet 75 Mg PO DAILY


Xarelto (Rivaroxaban) 20 Mg Tablet 20 Mg PO DAILY


Lasix (Furosemide) 40 Mg Tablet 1 Tab PO DAILY


K-Tab ER (Potassium Chloride) 20 Meq Tablet.er 20 Meq PO BID


Simvastatin 5 Mg Tablet 5 Mg PO HS


Multi-Vitamin Daily (Multivitamin) 1 Each Tablet 1 Each PO


Vitals/I & O





Vital Sign - Last 24 Hours








 12/11/17 12/11/17 12/11/17 12/11/17





 11:30 11:56 12:28 13:01


 


Temp 97.8   


 


Pulse 59 64 58 61


 


Resp 20 25 16 16


 


B/P (MAP)  126/60 (82) 118/60 (79) 124/70 (88)


 


Pulse Ox 97 97 96 96


 


O2 Delivery Room Air Room Air Nasal Cannula 


 


O2 Flow Rate   0.5 1.0


 


    





 12/11/17 12/11/17 12/11/17 12/11/17





 13:42 15:22 18:03 19:25


 


Temp 97.4 97.3  97.9


 


Pulse 60 73  77


 


Resp 24 24  22


 


B/P (MAP) 99/65 (76) 122/64 (83)  125/64 (84)


 


Pulse Ox 95 94 97 96


 


O2 Delivery Room Air Room Air Nasal Cannula Nasal Cannula


 


O2 Flow Rate   2.0 2.0


 


    





 12/11/17 12/11/17 12/11/17 12/12/17





 19:30 19:43 23:23 05:51


 


Temp   97.5 


 


Pulse   72 


 


Resp   20 


 


B/P (MAP)   100/61 (74) 


 


Pulse Ox   92 97


 


O2 Delivery Nasal Cannula Nasal Cannula Nasal Cannula Nasal Cannula


 


O2 Flow Rate 1.0 1.0 2.0 2.0


 


    





 12/12/17 12/12/17 12/12/17 12/12/17





 06:23 09:30 09:31 09:57


 


Temp 98.3   


 


Pulse 83 83 83 


 


Resp 20   


 


B/P (MAP) 129/71 (90) 129/71 129/71 


 


Pulse Ox 93   97


 


O2 Delivery Room Air   Room Air














Intake and Output   


 


 12/11/17 12/11/17 12/12/17





 15:00 23:00 07:00


 


Intake Total  240 ml 200 ml


 


Output Total  1400 ml 1000 ml


 


Balance  -1160 ml -800 ml

















ODETTE HANKS Dec 12, 2017 10:28

## 2017-12-12 NOTE — HP
ADMIT DATE:  12/11/2017



REASON FOR ADMISSION:  Weight gain, shortness of breath, congestion, cough.



HISTORY OF PRESENT ILLNESS:  This is an 83-year-old female who initially

presented to Dr. Christopehr Lewis's office yesterday morning, 12/11,

complaining of cough and shortness of breath.  She was then sent to the

Emergency Room and found to be in congestive heart failure.



PAST MEDICAL HISTORY:  She had an admission in 08/2017 with AFib with RVR,

congestive heart failure, memory loss, anemia, arthritis, COPD, hyperlipidemia,

chronic lymphedema, peripheral arterial disease with left subclavian stenosis

status post PTA, moderate mitral regurg, moderate tricuspid regurg and pulmonary

hypertension.



PAST SURGICAL HISTORY:  Left subclavian stent, hysterectomy, tonsillectomy.



SOCIAL HISTORY:  Smoked years ago, but quit.  No alcohol or drugs.



ALLERGIES:  SULFA.



MEDICATIONS:  Reviewed and the patient did provide a list.



REVIEW OF SYSTEMS:

Positive for lots of congestion for 10 days.  Positive wheezing.  Shortness of

breath for the last 10 days.  No energy.  Cough and weight gain of approximately

14 pounds.

SKIN:  Negative.

GENITOURINARY:  Slight burning with urination.



OBJECTIVE:

VITAL SIGNS:  Blood pressure 100/61, temperature 97.5, pulse 72, respirations

20, pulse ox was 92% on 2 liters.  At the time that I saw her, it was 97% on 2

liters.

Color is pale.

HEENT:  Tongue dry.

NECK:  Supple.  There is no JVD.

LUNGS:  With bilateral wheezes and crackles.

CARDIOVASCULAR:  Irregular rhythm and rate, 2/6 systolic murmur.

ABDOMEN:  Soft, nontender.

EXTREMITIES:  With 2+ edema.



Testing echocardiogram:  Ejection fraction 45% to 50%, mild to moderate mitral

regurg, mild to moderate tricuspid regurg, moderate to severe pulmonary

hypertension with a PA pressure of 67.



LABORATORY DATA:  CBC normal.  Chemistry:  Creatinine was 1.0, BUN 15.  Normal

electrolytes.  .  First troponin 0.017.  Her urine, large amount of

leukocyte esterase, wbc's 1-4.  Digoxin level is 1.1.



IMAGING:  Chest x-ray, mild cardiomegaly and aortic atherosclerosis.



ASSESSMENT:

1.  Acute on chronic diastolic and systolic heart failure.

2.  Cor pulmonale.

3.  Paroxysmal atrial fibrillation.

4.  Hypertension.

5.  Dyslipidemia.



PLAN:  Low-dose Lasix IV.  Monitor I and O's.  Breathing treatments, cardiac

diet.





______________________________

ELIANE WOLF DO



DR:  NONI/selvin  JOB#:  [id number] / [id number]

DD:  12/12/2017 10:16  DT:  12/12/2017 10:54

## 2017-12-13 VITALS
DIASTOLIC BLOOD PRESSURE: 64 MMHG | DIASTOLIC BLOOD PRESSURE: 64 MMHG | SYSTOLIC BLOOD PRESSURE: 109 MMHG | SYSTOLIC BLOOD PRESSURE: 109 MMHG | SYSTOLIC BLOOD PRESSURE: 109 MMHG | DIASTOLIC BLOOD PRESSURE: 64 MMHG | DIASTOLIC BLOOD PRESSURE: 64 MMHG | SYSTOLIC BLOOD PRESSURE: 109 MMHG

## 2017-12-13 VITALS — DIASTOLIC BLOOD PRESSURE: 88 MMHG | SYSTOLIC BLOOD PRESSURE: 120 MMHG

## 2017-12-13 LAB
ALBUMIN SERPL-MCNC: 3.5 G/DL (ref 3.4–5)
ALBUMIN/GLOB SERPL: 1 {RATIO} (ref 1–1.7)
ALP SERPL-CCNC: 97 U/L (ref 46–116)
ALT SERPL-CCNC: 18 U/L (ref 14–59)
ANION GAP SERPL CALC-SCNC: 9 MMOL/L (ref 6–14)
AST SERPL-CCNC: 18 U/L (ref 15–37)
BASOPHILS # BLD AUTO: 0.1 X10^3/UL (ref 0–0.2)
BASOPHILS NFR BLD: 1 % (ref 0–3)
BILIRUB SERPL-MCNC: 0.6 MG/DL (ref 0.2–1)
BUN/CREAT SERPL: 12 (ref 6–20)
CA-I SERPL ISE-MCNC: 13 MG/DL (ref 7–20)
CALCIUM SERPL-MCNC: 9 MG/DL (ref 8.5–10.1)
CHLORIDE SERPL-SCNC: 104 MMOL/L (ref 98–107)
CO2 SERPL-SCNC: 30 MMOL/L (ref 21–32)
CREAT SERPL-MCNC: 1.1 MG/DL (ref 0.6–1)
EOSINOPHIL NFR BLD: 0.2 X10^3/UL (ref 0–0.7)
EOSINOPHIL NFR BLD: 3 % (ref 0–3)
ERYTHROCYTE [DISTWIDTH] IN BLOOD BY AUTOMATED COUNT: 15.7 % (ref 11.5–14.5)
GFR SERPLBLD BASED ON 1.73 SQ M-ARVRAT: 47.4 ML/MIN
GLOBULIN SER-MCNC: 3.4 G/DL (ref 2.2–3.8)
GLUCOSE SERPL-MCNC: 97 MG/DL (ref 70–99)
HCT VFR BLD CALC: 34.1 % (ref 36–47)
HGB BLD-MCNC: 11.4 G/DL (ref 12–15.5)
LYMPHOCYTES # BLD: 1.3 X10^3/UL (ref 1–4.8)
LYMPHOCYTES NFR BLD AUTO: 21 % (ref 24–48)
MAGNESIUM SERPL-MCNC: 2.1 MG/DL (ref 1.8–2.4)
MCH RBC QN AUTO: 31 PG (ref 25–35)
MCHC RBC AUTO-ENTMCNC: 33 G/DL (ref 31–37)
MCV RBC AUTO: 92 FL (ref 79–100)
MONO #: 0.9 X10^3/UL (ref 0–1.1)
MONOCYTES NFR BLD: 15 % (ref 0–9)
NEUT #: 3.7 X10^3UL (ref 1.8–7.7)
NEUTROPHILS NFR BLD AUTO: 61 % (ref 31–73)
PLATELET # BLD AUTO: 266 X10^3/UL (ref 140–400)
POTASSIUM SERPL-SCNC: 3.7 MMOL/L (ref 3.5–5.1)
PROT SERPL-MCNC: 6.9 G/DL (ref 6.4–8.2)
RBC # BLD AUTO: 3.72 X10^6/UL (ref 3.5–5.4)
SODIUM SERPL-SCNC: 143 MMOL/L (ref 136–145)
WBC # BLD AUTO: 6 X10^3/UL (ref 4–11)

## 2017-12-13 RX ADMIN — AMIODARONE HYDROCHLORIDE SCH MG: 200 TABLET ORAL at 08:48

## 2017-12-13 RX ADMIN — POTASSIUM CHLORIDE SCH MEQ: 1500 TABLET, EXTENDED RELEASE ORAL at 08:47

## 2017-12-13 RX ADMIN — IPRATROPIUM BROMIDE AND ALBUTEROL SULFATE SCH ML: .5; 3 SOLUTION RESPIRATORY (INHALATION) at 05:43

## 2017-12-13 RX ADMIN — FUROSEMIDE SCH MG: 10 INJECTION, SOLUTION INTRAMUSCULAR; INTRAVENOUS at 08:46

## 2017-12-13 RX ADMIN — RIVAROXABAN SCH MG: 10 TABLET, FILM COATED ORAL at 08:47

## 2017-12-13 RX ADMIN — CLOPIDOGREL BISULFATE SCH MG: 75 TABLET ORAL at 08:47

## 2017-12-13 RX ADMIN — I-VITE, TAB 1000-60-2MG (60/BT) SCH TAB: TAB at 08:48

## 2017-12-13 RX ADMIN — IPRATROPIUM BROMIDE AND ALBUTEROL SULFATE SCH ML: .5; 3 SOLUTION RESPIRATORY (INHALATION) at 09:57

## 2017-12-13 NOTE — PDOC
PROGRESS NOTES


Diagnosis


Problem


Problems


Medical Problems:


(1) Atrial fibrillation


Status: Acute  





(2) CHF exacerbation


Status: Acute  





(3) Dyspnea


Status: Acute  





(4) Pedal edema


Status: Acute  








Assessment


Problems


Medical Problems:


(1) Atrial fibrillation


Status: Acute  





(2) CHF exacerbation


Status: Acute  





(3) Dyspnea


Status: Acute  





(4) Pedal edema


Status: Acute  





1. Cor pulmonale - CXR without congestion.  Maintaining SaO2 on room air.  

Change Lasix to PO. 


2. moderately severe PHTN - consider pulmonary referral


3. Paroxysmal atrial fibrillation, - she remains in atrial fibrillation with 

CVR at rest.  On amiodarone and Cardizem.  Xarelto for stroke prophylaxis. 


4. Hypertension.- Controlled on current medical therapy.


5. Dyslipidemia. - continue statin





Problems:  


Subjective


feels "75%" better, still has edema but reports at baseline.  no chest pain.  

dyspnea with getting up and going to shower.


Objective





Vital Signs








  Date Time  Temp Pulse Resp B/P (MAP) Pulse Ox O2 Delivery O2 Flow Rate FiO2


 


12/13/17 08:49  74  120/88    


 


12/13/17 05:39     83 Room Air  


 


12/13/17 05:34 98.3  20     


 


12/12/17 08:30       1.0 














Intake and Output 


 


 12/13/17





 07:00


 


Intake Total 1510 ml


 


Output Total 1100 ml


 


Balance 410 ml


 


 


 


Intake Oral 1510 ml


 


Output Urine Total 1100 ml


 


# Voids 2


 


# Bowel Movements 1








Abdomen:  Normal bowel sounds, Soft, No tenderness


Heart:  Other (irregular rate and rhythm, no gallops)


Extremities:  Other (2+ edema)


General:  Alert, Oriented X3, Cooperative, No acute distress


Lungs:  Other (decreased bases bilaterally)


Neuro:  Normal speech, Strength at 5/5 X4 ext


Psych/Mental Status:  Mental status NL, Mood NL


Review of Relevant


I have reviewed the following items kandice (where applicable) has been applied.


Labs





Laboratory Tests








Test


  12/11/17


11:40 12/11/17


11:54 12/11/17


11:58 12/12/17


06:10


 


White Blood Count


  6.1 x10^3/uL


(4.0-11.0) 


  


  5.3 x10^3/uL


(4.0-11.0)


 


Red Blood Count


  3.91 x10^6/uL


(3.50-5.40) 


  


  3.67 x10^6/uL


(3.50-5.40)


 


Hemoglobin


  11.8 g/dL


(12.0-15.5) 


  


  11.2 g/dL


(12.0-15.5)


 


Hematocrit


  36.1 %


(36.0-47.0) 


  


  33.6 %


(36.0-47.0)


 


Mean Corpuscular Volume 92 fL ()    92 fL () 


 


Mean Corpuscular Hemoglobin 30 pg (25-35)    31 pg (25-35) 


 


Mean Corpuscular Hemoglobin


Concent 33 g/dL


(31-37) 


  


  33 g/dL


(31-37)


 


Red Cell Distribution Width


  15.8 %


(11.5-14.5) 


  


  15.4 %


(11.5-14.5)


 


Platelet Count


  259 x10^3/uL


(140-400) 


  


  254 x10^3/uL


(140-400)


 


Neutrophils (%) (Auto) 67 % (31-73)    


 


Lymphocytes (%) (Auto) 17 % (24-48)    


 


Monocytes (%) (Auto) 13 % (0-9)    


 


Eosinophils (%) (Auto) 2 % (0-3)    


 


Basophils (%) (Auto) 1 % (0-3)    


 


Neutrophils # (Auto)


  4.1 x10^3uL


(1.8-7.7) 


  


  


 


 


Lymphocytes # (Auto)


  1.0 x10^3/uL


(1.0-4.8) 


  


  


 


 


Monocytes # (Auto)


  0.8 x10^3/uL


(0.0-1.1) 


  


  


 


 


Eosinophils # (Auto)


  0.1 x10^3/uL


(0.0-0.7) 


  


  


 


 


Basophils # (Auto)


  0.0 x10^3/uL


(0.0-0.2) 


  


  


 


 


Sodium Level


  144 mmol/L


(136-145) 


  


  144 mmol/L


(136-145)


 


Potassium Level


  4.4 mmol/L


(3.5-5.1) 


  


  3.5 mmol/L


(3.5-5.1)


 


Chloride Level


  106 mmol/L


() 


  


  105 mmol/L


()


 


Carbon Dioxide Level


  29 mmol/L


(21-32) 


  


  31 mmol/L


(21-32)


 


Anion Gap 9 (6-14)    8 (6-14) 


 


Blood Urea Nitrogen


  15 mg/dL


(7-20) 


  


  15 mg/dL


(7-20)


 


Creatinine


  1.0 mg/dL


(0.6-1.0) 


  


  1.2 mg/dL


(0.6-1.0)


 


Estimated GFR


(Cockcroft-Gault) 53.0 


  


  


  42.9 


 


 


BUN/Creatinine Ratio 15 (6-20)    13 (6-20) 


 


Glucose Level


  82 mg/dL


(70-99) 


  


  96 mg/dL


(70-99)


 


Lactic Acid Level


  1.2 mmol/L


(0.4-2.0) 


  


  


 


 


Calcium Level


  9.1 mg/dL


(8.5-10.1) 


  


  8.7 mg/dL


(8.5-10.1)


 


Total Bilirubin


  0.5 mg/dL


(0.2-1.0) 


  


  0.6 mg/dL


(0.2-1.0)


 


Aspartate Amino Transf


(AST/SGOT) 18 U/L (15-37) 


  


  


  17 U/L (15-37) 


 


 


Alanine Aminotransferase


(ALT/SGPT) 20 U/L (14-59) 


  


  


  18 U/L (14-59) 


 


 


Alkaline Phosphatase


  109 U/L


() 


  


  91 U/L


()


 


Creatine Kinase


  77 U/L


() 


  


  


 


 


Creatine Kinase MB (Mass)


  1.1 ng/mL


(0.0-3.6) 


  


  


 


 


Creatine Kinase MB Relative


Index 1.4 % (0-4) 


  


  


  


 


 


Troponin I Quantitative


  < 0.017 ng/mL


(0-0.055) 


  


  


 


 


NT-Pro-B-Type Natriuretic


Peptide 743 pg/mL


(0-449) 


  


  


 


 


Total Protein


  6.8 g/dL


(6.4-8.2) 


  


  6.5 g/dL


(6.4-8.2)


 


Albumin


  3.7 g/dL


(3.4-5.0) 


  


  3.2 g/dL


(3.4-5.0)


 


Albumin/Globulin Ratio 1.2 (1.0-1.7)    1.0 (1.0-1.7) 


 


Digoxin Level


  1.1 ng/dL


(0.9-2.0) 


  


  


 


 


Digoxin Last Dose Date 12/11/17    


 


Digoxin Last Dose Time 0800    


 


Urine Collection Type  Unknown   


 


Urine Color  Yellow   


 


Urine Clarity  Cloudy   


 


Urine pH  5.5   


 


Urine Specific Gravity  1.010   


 


Urine Protein


  


  Neg


(NEG-TRACE) 


  


 


 


Urine Glucose (UA)


  


  Neg mg/dL


(NEG) 


  


 


 


Urine Ketones (Stick)


  


  Neg mg/dL


(NEG) 


  


 


 


Urine Blood  Small (NEG)   


 


Urine Nitrite  Neg (NEG)   


 


Urine Bilirubin  Neg (NEG)   


 


Urine Urobilinogen Dipstick


  


  0.2 mg/dL (0.2


mg/dL) 


  


 


 


Urine Leukocyte Esterase  Large (NEG)   


 


Urine RBC  Occ /HPF (0-2)   


 


Urine WBC  1-4 /HPF (0-4)   


 


Urine Squamous Epithelial


Cells 


  Few /LPF 


  


  


 


 


Urine Amorphous Sediment  Present /HPF   


 


Urine Bacteria


  


  Few /HPF


(0-FEW) 


  


 


 


Urine Hyaline Casts  Few /HPF   


 


Prothrombin Time


  


  


  17.5 SEC


(9.4-11.4) 


 


 


Prothromb Time International


Ratio 


  


  1.4 (0.9-1.1) 


  


 


 


Activated Partial


Thromboplast Time 


  


  42 SEC (23-33) 


  


 


 


Magnesium Level


  


  


  


  1.9 mg/dL


(1.8-2.4)


 


Test


  12/13/17


08:10 


  


  


 


 


White Blood Count


  6.0 x10^3/uL


(4.0-11.0) 


  


  


 


 


Red Blood Count


  3.72 x10^6/uL


(3.50-5.40) 


  


  


 


 


Hemoglobin


  11.4 g/dL


(12.0-15.5) 


  


  


 


 


Hematocrit


  34.1 %


(36.0-47.0) 


  


  


 


 


Mean Corpuscular Volume 92 fL ()    


 


Mean Corpuscular Hemoglobin 31 pg (25-35)    


 


Mean Corpuscular Hemoglobin


Concent 33 g/dL


(31-37) 


  


  


 


 


Red Cell Distribution Width


  15.7 %


(11.5-14.5) 


  


  


 


 


Platelet Count


  266 x10^3/uL


(140-400) 


  


  


 


 


Neutrophils (%) (Auto) 61 % (31-73)    


 


Lymphocytes (%) (Auto) 21 % (24-48)    


 


Monocytes (%) (Auto) 15 % (0-9)    


 


Eosinophils (%) (Auto) 3 % (0-3)    


 


Basophils (%) (Auto) 1 % (0-3)    


 


Neutrophils # (Auto)


  3.7 x10^3uL


(1.8-7.7) 


  


  


 


 


Lymphocytes # (Auto)


  1.3 x10^3/uL


(1.0-4.8) 


  


  


 


 


Monocytes # (Auto)


  0.9 x10^3/uL


(0.0-1.1) 


  


  


 


 


Eosinophils # (Auto)


  0.2 x10^3/uL


(0.0-0.7) 


  


  


 


 


Basophils # (Auto)


  0.1 x10^3/uL


(0.0-0.2) 


  


  


 


 


Sodium Level


  143 mmol/L


(136-145) 


  


  


 


 


Potassium Level


  3.7 mmol/L


(3.5-5.1) 


  


  


 


 


Chloride Level


  104 mmol/L


() 


  


  


 


 


Carbon Dioxide Level


  30 mmol/L


(21-32) 


  


  


 


 


Anion Gap 9 (6-14)    


 


Blood Urea Nitrogen


  13 mg/dL


(7-20) 


  


  


 


 


Creatinine


  1.1 mg/dL


(0.6-1.0) 


  


  


 


 


Estimated GFR


(Cockcroft-Gault) 47.4 


  


  


  


 


 


BUN/Creatinine Ratio 12 (6-20)    


 


Glucose Level


  97 mg/dL


(70-99) 


  


  


 


 


Calcium Level


  9.0 mg/dL


(8.5-10.1) 


  


  


 


 


Magnesium Level


  2.1 mg/dL


(1.8-2.4) 


  


  


 


 


Total Bilirubin


  0.6 mg/dL


(0.2-1.0) 


  


  


 


 


Aspartate Amino Transf


(AST/SGOT) 18 U/L (15-37) 


  


  


  


 


 


Alanine Aminotransferase


(ALT/SGPT) 18 U/L (14-59) 


  


  


  


 


 


Alkaline Phosphatase


  97 U/L


() 


  


  


 


 


Total Protein


  6.9 g/dL


(6.4-8.2) 


  


  


 


 


Albumin


  3.5 g/dL


(3.4-5.0) 


  


  


 


 


Albumin/Globulin Ratio 1.0 (1.0-1.7)    








Microbiology


12/11/17 Urine Culture - Preliminary, Resulted


           


12/11/17 Urine Culture Result 1 (GINNY) - Preliminary, Resulted


Medications





Current Medications


Furosemide (Lasix) 20 mg 1X  ONCE IVP  Last administered on 12/11/17at 12:16;  

Start 12/11/17 at 12:15;  Stop 12/11/17 at 12:16;  Status DC


Furosemide (Lasix) 40 mg DAILY IVP  Last administered on 12/13/17at 08:46;  

Start 12/11/17 at 17:30


Albuterol/ Ipratropium (Duoneb) 3 ml STK-MED ONCE .ROUTE  Last administered on 

12/11/17at 18:02;  Start 12/11/17 at 17:59;  Stop 12/11/17 at 18:00;  Status DC


Albuterol/ Ipratropium (Duoneb) 3 ml RTQID NEB  Last administered on 12/13/17at 

05:43;  Start 12/11/17 at 20:00


Amiodarone HCl (Cordarone) 200 mg DAILY PO  Last administered on 12/13/17at 08:

48;  Start 12/12/17 at 09:00


Clopidogrel Bisulfate (Plavix) 75 mg DAILY PO  Last administered on 12/13/17at 

08:47;  Start 12/12/17 at 09:00


Diltiazem HCl (Cardizem 24hr Cd) 120 mg DAILY PO  Last administered on 12/13/ 17at 08:49;  Start 12/12/17 at 09:00


Guaifenesin (Mucinex Er) 600 mg BID PO  Last administered on 12/13/17at 08:48;  

Start 12/11/17 at 21:00


Albuterol/ Ipratropium (Duoneb) 3 ml RTQID NEB ;  Start 12/11/17 at 20:15;  

Status Cancel


Multivitamins/ Minerals (I-Xenia) 1 tab DAILY PO  Last administered on 12/13/ 17at 08:48;  Start 12/12/17 at 09:00


Potassium Chloride (Klor-Con) 20 meq BID PO  Last administered on 12/13/17at 08:

47;  Start 12/11/17 at 21:00


Rivaroxaban (Xarelto) 20 mg DAILY PO  Last administered on 12/13/17at 08:47;  

Start 12/12/17 at 09:00


Simvastatin (Zocor) 5 mg QHS PO  Last administered on 12/12/17at 20:42;  Start 

12/11/17 at 21:00





Active Scripts


Active


Mucinex (Guaifenesin) 600 Mg Tablet.er 1 Tab PO BID


Duoneb 0.5-3(2.5) Mg/3 Ml (Albuterol/Ipratropium) 3 Ml Ampul.neb 3 Ml NEB QID 

30 Days


Amiodarone Hcl 200 Mg Tablet 1 Tab PO DAILY


Reported


Ocuvite Tablet (Vit A,C & E/Lutein/Minerals) 1 Each Tablet 1 Each PO DAILY


Cartia Xt (Diltiazem Hcl) 120 Mg Cap.er.24h 120 Mg PO DAILY


Plavix (Clopidogrel Bisulfate) 75 Mg Tablet 75 Mg PO DAILY


Xarelto (Rivaroxaban) 20 Mg Tablet 20 Mg PO DAILY


Lasix (Furosemide) 40 Mg Tablet 1 Tab PO DAILY


K-Tab ER (Potassium Chloride) 20 Meq Tablet.er 20 Meq PO BID


Simvastatin 5 Mg Tablet 5 Mg PO HS


Multi-Vitamin Daily (Multivitamin) 1 Each Tablet 1 Each PO


Vitals/I & O





Vital Sign - Last 24 Hours








 12/12/17 12/12/17 12/12/17 12/12/17





 09:57 11:04 15:27 15:34


 


Temp  98.1 98.1 


 


Pulse  87 68 


 


Resp  22 22 


 


B/P (MAP)  102/62 (75) 103/55 (71) 


 


Pulse Ox 97 95 94 96


 


O2 Delivery Room Air Room Air Room Air Room Air


 


    





 12/12/17 12/12/17 12/12/17 12/12/17





 18:56 19:40 21:00 22:31


 


Temp 97.4   97.3


 


Pulse 75   93


 


Resp 20   22


 


B/P (MAP) 121/80 (94)   116/61 (79)


 


Pulse Ox 96  98 94


 


O2 Delivery Room Air Room Air Room Air Room Air


 


    





 12/13/17 12/13/17 12/13/17 12/13/17





 05:34 05:39 08:48 08:49


 


Temp 98.3   


 


Pulse 74  74 74


 


Resp 20   


 


B/P (MAP) 120/88 (99)  120/88 120/88


 


Pulse Ox 96 83  


 


O2 Delivery Room Air Room Air  














Intake and Output   


 


 12/12/17 12/12/17 12/13/17





 15:00 23:00 07:00


 


Intake Total 240 ml 720 ml 550 ml


 


Output Total 300 ml 300 ml 500 ml


 


Balance -60 ml 420 ml 50 ml

















ODETTE HANKS Dec 13, 2017 09:47

## 2017-12-13 NOTE — DS
DATE OF DISCHARGE:  12/13/2017



DISCHARGE DIAGNOSES:

1.  Cor pulmonale, treated with Lasix.

2.  Moderate severe pulmonary hypertension.

3.  Paroxysmal atrial fibrillation, mostly in sinus.

4.  Hypertension.

5.  Dyslipidemia.

6.  Sleep apnea suspect.

7.  Chronic obstructive pulmonary disease

8.  Moderate mitral regurgitation.

9.  Moderate tricuspid regurgitation.



HOSPITAL COURSE:  The patient is an 83-year-old female who was admitted by Dr. Christopher Lewis with complaints of increasing shortness of breath and weight

gain.  She was treated with IV Lasix, which did give her quite a bit of relief. 

She does need a sleep study.  She did not fail her 6-minute walk and is not

requiring oxygen on a regular basis.  She had an uneventful hospitalization. 

She was seen by Cardiology as well.  She was discharged in good condition. 

Blood pressure 109/64, pulse 102, respirations 20, pulse ox was 94% on room air.



PLAN:  She has an appointment with Dr. Celaya on 12/21/2017, should get a sleep

study, and went back on the 40 of Lasix and low fat, low salt diet.





______________________________

ELIANE WOLF DO



DR:  NONI/selvin  JOB#:  [id number] / [id number]

DD:  12/13/2017 14:34  DT:  12/13/2017 21:45



CHRISTOPHER Mccullough MD

## 2018-01-24 ENCOUNTER — HOSPITAL ENCOUNTER (OUTPATIENT)
Dept: HOSPITAL 61 - NM | Age: 83
Discharge: HOME | End: 2018-01-24
Attending: INTERNAL MEDICINE
Payer: COMMERCIAL

## 2018-01-24 DIAGNOSIS — I50.33: Primary | ICD-10-CM

## 2018-01-24 DIAGNOSIS — I48.91: ICD-10-CM

## 2018-01-24 PROCEDURE — 96374 THER/PROPH/DIAG INJ IV PUSH: CPT

## 2018-01-24 PROCEDURE — 93017 CV STRESS TEST TRACING ONLY: CPT

## 2018-01-24 PROCEDURE — 96375 TX/PRO/DX INJ NEW DRUG ADDON: CPT

## 2018-01-24 PROCEDURE — 96376 TX/PRO/DX INJ SAME DRUG ADON: CPT

## 2018-01-24 PROCEDURE — 78452 HT MUSCLE IMAGE SPECT MULT: CPT

## 2018-01-24 RX ADMIN — REGADENOSON 1 MG: 0.08 INJECTION, SOLUTION INTRAVENOUS at 11:04

## 2018-01-29 ENCOUNTER — HOSPITAL ENCOUNTER (OUTPATIENT)
Dept: HOSPITAL 61 - SURG | Age: 83
Discharge: HOME | End: 2018-01-29
Attending: INTERNAL MEDICINE
Payer: COMMERCIAL

## 2018-01-29 DIAGNOSIS — Z87.891: ICD-10-CM

## 2018-01-29 DIAGNOSIS — Z90.721: ICD-10-CM

## 2018-01-29 DIAGNOSIS — K21.9: ICD-10-CM

## 2018-01-29 DIAGNOSIS — Z88.2: ICD-10-CM

## 2018-01-29 DIAGNOSIS — Z88.1: ICD-10-CM

## 2018-01-29 DIAGNOSIS — E66.9: ICD-10-CM

## 2018-01-29 DIAGNOSIS — C44.90: ICD-10-CM

## 2018-01-29 DIAGNOSIS — I50.9: ICD-10-CM

## 2018-01-29 DIAGNOSIS — Z90.710: ICD-10-CM

## 2018-01-29 DIAGNOSIS — E78.00: ICD-10-CM

## 2018-01-29 DIAGNOSIS — I25.10: ICD-10-CM

## 2018-01-29 DIAGNOSIS — M19.90: ICD-10-CM

## 2018-01-29 DIAGNOSIS — J44.9: ICD-10-CM

## 2018-01-29 DIAGNOSIS — I48.91: Primary | ICD-10-CM

## 2018-01-29 DIAGNOSIS — G47.30: ICD-10-CM

## 2018-01-29 LAB
ANION GAP SERPL CALC-SCNC: 7 MMOL/L (ref 6–14)
BLOOD UREA NITROGEN: 13 MG/DL (ref 7–20)
CALCIUM: 8.9 MG/DL (ref 8.5–10.1)
CHLORIDE: 103 MMOL/L (ref 98–107)
CO2 SERPL-SCNC: 26 MMOL/L (ref 21–32)
CREAT SERPL-MCNC: 1.1 MG/DL (ref 0.6–1)
GFR SERPLBLD BASED ON 1.73 SQ M-ARVRAT: 47.4 ML/MIN
GLUCOSE SERPL-MCNC: 96 MG/DL (ref 70–99)
HCG SERPL-ACNC: 6 X10^3/UL (ref 4–11)
HEMATOCRIT: 37 % (ref 36–47)
HEMOGLOBIN: 11.5 G/DL (ref 12–15.5)
MAGNESIUM: 1.9 MG/DL (ref 1.8–2.4)
MEAN CORPUSCULAR HEMOGLOBIN: 30 PG (ref 25–35)
MEAN CORPUSCULAR HGB CONC: 31 G/DL (ref 31–37)
MEAN CORPUSCULAR VOLUME: 95 FL (ref 79–100)
PLATELET COUNT: 277 X10^3/UL (ref 140–400)
POTASSIUM SERPL-SCNC: 3.9 MMOL/L (ref 3.5–5.1)
RED BLOOD COUNT: 3.9 X10^6/UL (ref 3.5–5.4)
RED CELL DISTRIBUTION WIDTH: 15.6 % (ref 11.5–14.5)
SODIUM: 136 MMOL/L (ref 136–145)

## 2018-01-29 PROCEDURE — 36415 COLL VENOUS BLD VENIPUNCTURE: CPT

## 2018-01-29 PROCEDURE — 93005 ELECTROCARDIOGRAM TRACING: CPT

## 2018-01-29 PROCEDURE — 83735 ASSAY OF MAGNESIUM: CPT

## 2018-01-29 PROCEDURE — 80048 BASIC METABOLIC PNL TOTAL CA: CPT

## 2018-01-29 PROCEDURE — 92960 CARDIOVERSION ELECTRIC EXT: CPT

## 2018-01-29 PROCEDURE — 85027 COMPLETE CBC AUTOMATED: CPT

## 2018-01-29 RX ADMIN — SODIUM CHLORIDE, SODIUM LACTATE, POTASSIUM CHLORIDE, AND CALCIUM CHLORIDE 1 MLS/HR: .6; .31; .03; .02 INJECTION, SOLUTION INTRAVENOUS at 09:55

## 2019-02-19 ENCOUNTER — HOSPITAL ENCOUNTER (OUTPATIENT)
Dept: HOSPITAL 63 - CT | Age: 84
Discharge: HOME | End: 2019-02-19
Attending: FAMILY MEDICINE
Payer: COMMERCIAL

## 2019-02-19 DIAGNOSIS — G31.89: Primary | ICD-10-CM

## 2019-02-19 PROCEDURE — 70450 CT HEAD/BRAIN W/O DYE: CPT

## 2019-02-19 NOTE — RAD
CT of the head without contrast, 2/19/2019:

 

HISTORY: Mild cognitive impairment

 

There is moderate cerebral and mild cerebellar atrophy. The ventricles are

within normal limits in size. There is no shift of the midline structures.

There is no evidence of acute intracranial hemorrhage or mass effect. 

There is calcific plaquing of the distal internal carotid and vertebral 

arteries.

 

IMPRESSION:

1. Moderate cerebral atrophy.

2. No acute intracranial abnormality is detected.

 

 

RS Compliance Statement:

 

One or more of the following individualized dose reduction techniques were

utilized for this examination:

1. Automated exposure control

2. Adjustment of the mA and/or kV according to patient size

3. Use of iterative reconstruction technique

 

Electronically signed by: Rick Moritz, MD (2/19/2019 10:38 AM) [facility]

## 2019-09-19 ENCOUNTER — HOSPITAL ENCOUNTER (OUTPATIENT)
Dept: HOSPITAL 63 - ECHO | Age: 84
Discharge: HOME | End: 2019-09-19
Payer: COMMERCIAL

## 2019-09-19 DIAGNOSIS — I27.20: ICD-10-CM

## 2019-09-19 DIAGNOSIS — I50.32: ICD-10-CM

## 2019-09-19 DIAGNOSIS — I11.0: ICD-10-CM

## 2019-09-19 DIAGNOSIS — I08.3: Primary | ICD-10-CM

## 2019-09-19 DIAGNOSIS — I48.0: ICD-10-CM

## 2019-09-19 PROCEDURE — 93306 TTE W/DOPPLER COMPLETE: CPT

## 2019-09-19 NOTE — CARD
MR#: [id number]

Account#: [id number]

Accession#: [id number]

Date of Study: 09/19/2019

Ordering Physician: CURLY GAUTAM, 

Referring Physician: CURLY GAUTAM 

Tech: Mary Christopher RDCS





--------------- APPROVED REPORT --------------





EXAM: Two-dimensional and M-mode echocardiogram with Doppler and color Doppler.



Other Information 

Rhythm : Atrial Fibrillation



INDICATION

Atrial Fibrillation

Congestive Heart Failure 



2D DIMENSIONS 

RVDd2.6 (2.9-3.5cm)Left Atrium(2D)4.2 (1.6-4.0cm)

IVSd0.9 (0.7-1.1cm)Aortic Root(2D)3.0 (2.0-3.7cm)

LVDd5.7 (3.9-5.9cm)LVOT Diameter2.0 (1.8-2.4cm)

PWd1.0 (0.7-1.1cm)LVDs4.2 (2.5-4.0cm)

FS (%) 25.7 %SV79.5 ml

LVEF(%)49.9 (>50%)



Aortic Valve

AoV Peak Chris.148.6cm/sAoV VTI25.5cm

AO Peak GR.8.8mmHgLVOT Peak Chris.87.8cm/s

LVOT  VTI 15.91cmAO Mean GR.5mmHg

SYD (VMAX)1.88ls4XJW   (VTI)1.96cm2



Mitral Valve

MV E Vugptcql68.2cm/sMV DECEL [id number]

MV A Osxbxuyx13.5cm/sE/A  Ratio2.9



Tricuspid Valve

TR P. Jhyefxkw822ym/sRAP VDPJHGBC6fzVf

TR Peak Gr.42ucZcAYAM49coBu



Pulmonary Vein

S1 Czhwqumk89.2cm/sD2 Xrxhzgwc05.0cm/s



 LEFT VENTRICLE 

The left ventricle is normal size. There is normal left ventricular wall thickness. Left ventricle sy
stolic function is low normal. The Ejection Fraction is 50-55%. There is normal LV segmental wall mot
ion. Transmitral Doppler flow pattern is Grade II-pseudonormal filling dynamics.



 RIGHT VENTRICLE 

The right ventricle is mildly dilated. The right ventricular systolic function is normal.



 ATRIA 

The left atrium is severely dilated. The right atrium is severely dilated. The interatrial septum is 
intact with no evidence for an atrial septal defect or patent foramen ovale as noted on 2-D or Dopple
r imaging.



 AORTIC VALVE 

The aortic valve is calcified but opens well. Doppler and Color Flow revealed no significant aortic r
egurgitation. There is no significant aortic valvular stenosis.



 MITRAL VALVE 

The mitral valve is calcified but opens well. Mitral annular calcification is mild. There is no evide
nce of mitral valve prolapse. There is no mitral valve stenosis. Doppler and Color-flow revealed mild
 to moderate mitral regurgitation.



 TRICUSPID VALVE 

The tricuspid valve is normal in structure and function. Doppler and Color Flow revealed mild to mode
rate tricuspid regurgitation. There is moderate pulmonary hypertension. The PA pressure was estimated
 at 56 mmHg. There is no tricuspid valve stenosis.



 PULMONIC VALVE 

The pulmonic valve is not well visualized. Doppler and Color Flow revealed no pulmonic valvular regur
gitation. There is no pulmonic valvular stenosis.



 GREAT VESSELS 

The aortic root is normal in size. The ascending aorta is mildly dilated at 3.4 cm. The IVC is dilate
d but responsive.



 PERICARDIAL EFFUSION 

There is no evidence of significant pericardial effusion.



Critical Notification

Critical Value: No



<Conclusion>

Left ventricle systolic function is low normal. The Ejection Fraction is 50-55%.

There is normal LV segmental wall motion.

Doppler and Color Flow revealed mild to moderate tricuspid regurgitation. There is moderate pulmonary
 hypertension. The PA pressure was estimated at 56 mmHg.



Signed by : Catrachito Omalley, 

Electronically Approved : 09/19/2019 09:10:16

## 2019-10-14 ENCOUNTER — HOSPITAL ENCOUNTER (OUTPATIENT)
Dept: HOSPITAL 61 - CCL | Age: 84
End: 2019-10-14
Attending: INTERNAL MEDICINE
Payer: COMMERCIAL

## 2019-10-14 VITALS — SYSTOLIC BLOOD PRESSURE: 97 MMHG | DIASTOLIC BLOOD PRESSURE: 54 MMHG

## 2019-10-14 VITALS
DIASTOLIC BLOOD PRESSURE: 55 MMHG | SYSTOLIC BLOOD PRESSURE: 98 MMHG | DIASTOLIC BLOOD PRESSURE: 55 MMHG | SYSTOLIC BLOOD PRESSURE: 98 MMHG

## 2019-10-14 VITALS — DIASTOLIC BLOOD PRESSURE: 58 MMHG | SYSTOLIC BLOOD PRESSURE: 104 MMHG

## 2019-10-14 VITALS — SYSTOLIC BLOOD PRESSURE: 97 MMHG | DIASTOLIC BLOOD PRESSURE: 57 MMHG

## 2019-10-14 VITALS — SYSTOLIC BLOOD PRESSURE: 98 MMHG | DIASTOLIC BLOOD PRESSURE: 51 MMHG

## 2019-10-14 VITALS — SYSTOLIC BLOOD PRESSURE: 98 MMHG | DIASTOLIC BLOOD PRESSURE: 59 MMHG

## 2019-10-14 VITALS — SYSTOLIC BLOOD PRESSURE: 98 MMHG | DIASTOLIC BLOOD PRESSURE: 63 MMHG

## 2019-10-14 VITALS — HEIGHT: 68 IN | WEIGHT: 180 LBS | BODY MASS INDEX: 27.28 KG/M2

## 2019-10-14 VITALS — SYSTOLIC BLOOD PRESSURE: 104 MMHG | DIASTOLIC BLOOD PRESSURE: 54 MMHG

## 2019-10-14 VITALS — SYSTOLIC BLOOD PRESSURE: 104 MMHG | DIASTOLIC BLOOD PRESSURE: 51 MMHG

## 2019-10-14 VITALS — SYSTOLIC BLOOD PRESSURE: 87 MMHG | DIASTOLIC BLOOD PRESSURE: 58 MMHG

## 2019-10-14 VITALS — DIASTOLIC BLOOD PRESSURE: 51 MMHG | SYSTOLIC BLOOD PRESSURE: 104 MMHG

## 2019-10-14 DIAGNOSIS — I25.10: Primary | ICD-10-CM

## 2019-10-14 DIAGNOSIS — I50.32: ICD-10-CM

## 2019-10-14 LAB
ANION GAP SERPL CALC-SCNC: 10 MMOL/L (ref 6–14)
BUN SERPL-MCNC: 11 MG/DL (ref 7–20)
CALCIUM SERPL-MCNC: 9.3 MG/DL (ref 8.5–10.1)
CHLORIDE SERPL-SCNC: 103 MMOL/L (ref 98–107)
CO2 SERPL-SCNC: 28 MMOL/L (ref 21–32)
CREAT SERPL-MCNC: 1.1 MG/DL (ref 0.6–1)
ERYTHROCYTE [DISTWIDTH] IN BLOOD BY AUTOMATED COUNT: 13.2 % (ref 11.5–14.5)
GFR SERPLBLD BASED ON 1.73 SQ M-ARVRAT: 47.2 ML/MIN
GLUCOSE SERPL-MCNC: 108 MG/DL (ref 70–99)
HCT VFR BLD CALC: 38.3 % (ref 36–47)
HGB BLD-MCNC: 13.2 G/DL (ref 12–15.5)
MCH RBC QN AUTO: 33 PG (ref 25–35)
MCHC RBC AUTO-ENTMCNC: 34 G/DL (ref 31–37)
MCV RBC AUTO: 96 FL (ref 79–100)
PLATELET # BLD AUTO: 293 X10^3/UL (ref 140–400)
POTASSIUM SERPL-SCNC: 3.2 MMOL/L (ref 3.5–5.1)
PROTHROMBIN TIME: 15.7 SEC (ref 11.7–14)
RBC # BLD AUTO: 3.98 X10^6/UL (ref 3.5–5.4)
SODIUM SERPL-SCNC: 141 MMOL/L (ref 136–145)
WBC # BLD AUTO: 15.7 X10^3/UL (ref 4–11)

## 2019-10-14 PROCEDURE — 85610 PROTHROMBIN TIME: CPT

## 2019-10-14 PROCEDURE — 99153 MOD SED SAME PHYS/QHP EA: CPT

## 2019-10-14 PROCEDURE — C1771 REP DEV, URINARY, W/SLING: HCPCS

## 2019-10-14 PROCEDURE — 80048 BASIC METABOLIC PNL TOTAL CA: CPT

## 2019-10-14 PROCEDURE — G0269 OCCLUSIVE DEVICE IN VEIN ART: HCPCS

## 2019-10-14 PROCEDURE — 93460 R&L HRT ART/VENTRICLE ANGIO: CPT

## 2019-10-14 PROCEDURE — 36415 COLL VENOUS BLD VENIPUNCTURE: CPT

## 2019-10-14 PROCEDURE — C1773 RET DEV, INSERTABLE: HCPCS

## 2019-10-14 PROCEDURE — C1769 GUIDE WIRE: HCPCS

## 2019-10-14 PROCEDURE — 85027 COMPLETE CBC AUTOMATED: CPT

## 2019-10-14 PROCEDURE — 99152 MOD SED SAME PHYS/QHP 5/>YRS: CPT

## 2019-10-14 PROCEDURE — C1892 INTRO/SHEATH,FIXED,PEEL-AWAY: HCPCS

## 2019-10-14 NOTE — CARD
MR#: [id number]

Account#: [id number]

Accession#: [id number]

Date of Study: 10/14/2019

Ordering Physician: CURLY LUNA, 

Referring Physician: CURLY LUNA 

Tech: RT Otto (R)





--------------- APPROVED REPORT --------------





Technologist: RT Otto (R)

Nurse: Marci Bush RN



Procedure(s) performed: Right and left heart catheterization, selective coronary angiography and left
 ventriculography

Fluoro time: 12.5min

Dose:59Qzmr9

Contrast: 137cc

Moderate sedation:45 minutes



INDICATION

The indication(s) include : Refractory dyspnea on exertion.



[facility] Clinical Frailty Scale

[facility] Clinical Frailty Scale: Mildly Frail



Heart Failure

Heart Failure: No



PROCEDURE NARRATIVE

After explaining the risks, benefits and alternative options, informed consent was obtained from flaco
ent. Patient was brought to the cardiac Cath Lab and her right groin was prepped and draped in the us
ual fashion. 20 mL of 2% lidocaine was infiltrated into the skin and subcutaneous tissues for local a
nesthesia. Arterial and venous accesses were obtained in the right common femoral artery and vein res
pectively and 6 and 8 Salvadorean sheaths inserted. A 7.5 Salvadorean [place] catheter was then advanced unde
r fluoroscopy guidance and intracardiac pressures, oxygen saturations and cardiac output by thermodil
ution method were measured. Subsequently, 6 Salvadorean JL4 and 6 Salvadorean JR4 catheters were used to perfor
m selective angiography of the left and right coronary arteries. Finally, 6 Salvadorean pigtail catheter w
as used to perform left ventriculography. Patient tolerated the procedure well. Hemostasis was achiev
ed using Angio-Seal and manual compression. There were no immediate complications. The following find
ings were noted.



FINDINGS

A.  RIGHT HEART CATHETERIZATION

1.  Intracardiac pressures:  Mean right atrial pressure 9 mmHg, right ventricular pressure 35/4 mmHg,
 pulmonary artery pressure 46/2 mmHg with mean PA pressure 22 mmHg and mean pulmonary capillary wedge
 pressure 12 mmHg. No significant pulmonary hypertension.

2.  Oxygen saturations: Right atrium 68.2%, pulmonary artery 66.8%, femoral arterial sheath 94.5%. No
 evidence of intracardiac shunt.

3.  Cardiac output by thermodilution method 4.8 L/m.



B.  LEFT HEART CATHETERIZATION

1.  Hemodynamics:  Left ventricular end-diastolic pressure 16 mmHg. No pullback gradient across the a
ortic valve.

2.  Left ventriculography:  Mild left ventricle systolic dysfunction with ejection fraction estimated
 at 40-45%. No significant mitral regurgitation seen.

3.  Coronary angiography:

a.  The left main coronary artery arose from the left sinus of Valsalva, gave rise to the left anteri
or descending and left circumflex arteries and did not show any significant stenosis.

b.  The left anterior descending artery showed 30% stenosis in a tortuous midsegment. No critical les
ions were noted.

c.  The left circumflex artery showed minimal luminal irregularities without any significant stenosis
.

d. The right coronary artery was a large and dominant vessel arising from the right sinus of Valsalva
 that did not show any significant stenosis.  



Conclusion

1.  No significant coronary artery disease

2.  Mild left ventricle systolic dysfunction with ejection fraction estimated at 40-45%

3.  No significant pulmonary hypertension

4.  No evidence of intracardial shunt



Recommendations

Medical Therapy



Signed by : Curly Luna, 

Electronically Approved : 10/14/2019 10:59:12

## 2019-10-14 NOTE — NUR
pt A&O x 3 but is forgetful. denies pain, nausea or dizziness. ambulated to BR w/ SBA w/o 
problem. tolerating po well. vss. rt groin site clean D&I. no bleeding or swelling noted. 
d/c instructions given to pt and her dtr. questions answered. out to vehicle per w/c- her 
dtr to drive her home

## 2019-10-14 NOTE — PDOC
MODERATE SEDATION ASSESSMENT


RISKS/ALTERNATIVES


Risks/Alternatives


Risks and alternatives of this type of sedation and procedure discussed with:


RISK/ALTERNATIVES:  Patient





H & P ON CHART


H & P


H & P on chart and reviewed for co-morbid conditions and appropriate labs.


H&P ON CHART:  Yes





PREGNANCY STATUS


PREG STATUS ASSESSED:  N/A





MEDS/ALLERGIES REVIEWED


Meds/Allergies Reviewed


Medications and Allergies including time and route of recently administered 

narcotics and sedatives.


MEDS/ALLERGIES REVIEWED:  Yes





ASA RATING


ASA RATING:  III





AIRWAY ASSESSMENT


Airway Assessment


Airway patency, oral function limitations, presence  of caps, crowns, dentures, 

partials, and ability to extend neck assessed.


AIRWAY ASSESSMENT:  Yes





MALLAMPATI SCORE


MALLAMPATI SCORE:  II





PRE-SEDATION ASSESSMENT


PRE-SEDATION ASSESSMENT:  Yes











UCRLY GAUTAM MD           Oct 14, 2019 10:41

## 2020-11-09 ENCOUNTER — HOSPITAL ENCOUNTER (OUTPATIENT)
Dept: HOSPITAL 63 - DXRAD | Age: 85
End: 2020-11-09
Attending: FAMILY MEDICINE
Payer: MEDICARE

## 2020-11-09 DIAGNOSIS — L03.116: Primary | ICD-10-CM

## 2020-11-09 PROCEDURE — 73590 X-RAY EXAM OF LOWER LEG: CPT

## 2020-11-09 NOTE — RAD
Examination: 2 views of the left tibia and fibula

 

HISTORY: History of pain

 

COMPARISON: None available.

 

FINDINGS:

 

The alignment of the tibia and fibula grossly appears unremarkable. There 

is no acute fracture or dislocation identified. Osseous demineralization 

limits evaluation.

 

IMPRESSION:

 

1. No acute osseous findings

 

Electronically signed by: Chavez Liu MD (11/9/2020 5:26 PM) [id number]

## 2021-01-18 ENCOUNTER — HOSPITAL ENCOUNTER (OUTPATIENT)
Dept: HOSPITAL 61 - ECHO | Age: 86
End: 2021-01-18
Attending: INTERNAL MEDICINE
Payer: MEDICARE

## 2021-01-18 DIAGNOSIS — I48.0: ICD-10-CM

## 2021-01-18 DIAGNOSIS — I08.8: Primary | ICD-10-CM

## 2021-01-18 PROCEDURE — 93306 TTE W/DOPPLER COMPLETE: CPT

## 2021-01-18 NOTE — CARD
MR#: [id number]

Account#: [id number]

Accession#: [id number]

Date of Study: 01/18/2021

Ordering Physician: CURLY GAUTAM, 

Referring Physician: CURLY GAUTAM, 

Tech: Erica Duncan [facility]





--------------- APPROVED REPORT --------------





EXAM: Two-dimensional and M-mode echocardiogram with Doppler and color Doppler.



Other Information 

Quality : AverageHR: 67bpm

Rhythm : Atrial Fibrillation



INDICATION

Arrhythmia 

Dyspnea 

Atrial Fibrillation



RISK FACTORS

Hypertension 

Smoking 



2D DIMENSIONS 

RVDd4.2 (2.9-3.5cm)Left Atrium(2D)5.0 (1.6-4.0cm)

IVSd1.2 (0.7-1.1cm)Aortic Root(2D)3.4 (2.0-3.7cm)

LVDd5.8 (3.9-5.9cm)LVOT Diameter2.1 (1.8-2.4cm)

PWd1.1 (0.7-1.1cm)LVDs5.3 (2.5-4.0cm)

FS (%) 9.2 %SV33.6 ml



Aortic Valve

AoV Peak Chris.153.3cm/Alley Peak GR.10.2mmHg

LVOT Peak Chris.89.9cm/sAVA (VMAX)2.11cm2



Mitral Valve

MV E Rkjuxrst995.4cm/sMV DECEL [id number]

MV A Fndydwzs61.8cm/sE/A  Ratio3.3



Tricuspid Valve

TR P. [id number]/sTR Peak Gr.39mmHg



 LEFT VENTRICLE 

The Left Ventricle is mildly dilated. There is borderline to mild concentric left ventricular hypertr
ophy. The left ventricular systolic function is normal. The ejection fraction is estimated at 55-60%.
 There is normal LV segmental wall motion. Tissue Doppler imaging reveals moderate left ventricular d
iastolic dysfunction.



 RIGHT VENTRICLE 

The right ventricle is borderline dilated. There is normal right ventricular wall thickness. The righ
t ventricular systolic function is normal.



 ATRIA 

The left atrium is severely dilated. The right atrium is severely dilated.



 AORTIC VALVE 

The aortic valve is normal in structure and function. Doppler and Color Flow revealed mild aortic reg
urgitation. There is no significant aortic valvular stenosis.



 MITRAL VALVE 

The mitral valve is thickened but opens well. There is no evidence of mitral valve prolapse. There is
 no mitral valve stenosis. Doppler and Color-flow revealed mild to moderate mitral regurgitation.



 TRICUSPID VALVE 

The tricuspid valve is normal in structure and function. Doppler and Color Flow revealed moderate tri
cuspid regurgitation. Estimated PAP 49 mmHg. There is no tricuspid valve stenosis. 



 PULMONIC VALVE 

The pulmonary valve is normal in structure and function. Doppler and Color Flow revealed trace to mil
d pulmonic valvular regurgitation.



 GREAT VESSELS 

The aortic root is normal in size. The ascending aorta is normal in size. The IVC is normal in size a
nd collapses >50% with inspiration.



 PERICARDIAL EFFUSION 

There is no evidence of significant pericardial effusion.



Critical Notification

Critical Value: No



<Conclusion>

The left ventricular systolic function is normal. 

The ejection fraction is estimated at 55-60%.

There is normal LV segmental wall motion.

Tissue Doppler imaging reveals moderate left ventricular diastolic dysfunction.

Severe biatrial enlargement.

Mild aortic regurgitation.

Mild to moderate mitral regurgitation.

Moderate tricuspid regurgitation.  Estimated PAP 49 mmHg.

There is no evidence of significant pericardial effusion.



Signed by : Curly Gautam, 

Electronically Approved : 01/18/2021 15:11:55